# Patient Record
Sex: FEMALE | Race: WHITE | NOT HISPANIC OR LATINO | Employment: FULL TIME | URBAN - METROPOLITAN AREA
[De-identification: names, ages, dates, MRNs, and addresses within clinical notes are randomized per-mention and may not be internally consistent; named-entity substitution may affect disease eponyms.]

---

## 2017-06-16 ENCOUNTER — TRANSCRIBE ORDERS (OUTPATIENT)
Dept: LAB | Facility: HOSPITAL | Age: 52
End: 2017-06-16

## 2017-06-16 ENCOUNTER — APPOINTMENT (OUTPATIENT)
Dept: LAB | Facility: HOSPITAL | Age: 52
End: 2017-06-16

## 2017-06-16 DIAGNOSIS — Z00.8 HEALTH EXAMINATION IN POPULATION SURVEY: Primary | ICD-10-CM

## 2017-06-16 DIAGNOSIS — Z00.8 HEALTH EXAMINATION IN POPULATION SURVEY: ICD-10-CM

## 2017-06-16 LAB
CHOLEST SERPL-MCNC: 210 MG/DL (ref 50–200)
EST. AVERAGE GLUCOSE BLD GHB EST-MCNC: 105 MG/DL
HBA1C MFR BLD: 5.3 % (ref 4.2–6.3)
HDLC SERPL-MCNC: 93 MG/DL (ref 40–60)
LDLC SERPL CALC-MCNC: 99 MG/DL (ref 0–100)
TRIGL SERPL-MCNC: 89 MG/DL

## 2017-06-16 PROCEDURE — 83036 HEMOGLOBIN GLYCOSYLATED A1C: CPT

## 2017-06-16 PROCEDURE — 36415 COLL VENOUS BLD VENIPUNCTURE: CPT

## 2017-06-16 PROCEDURE — 80061 LIPID PANEL: CPT

## 2018-01-05 ENCOUNTER — ALLSCRIPTS OFFICE VISIT (OUTPATIENT)
Dept: OTHER | Facility: OTHER | Age: 53
End: 2018-01-05

## 2018-01-06 NOTE — PROGRESS NOTES
Assessment   1  Fever (780 60) (R50 9)   2  URI, acute (465 9) (J06 9)   3  Acute viral syndrome (079 99) (B34 9)    Plan   URI, acute    · Drink at least 6 glasses of water or juice a day ; Status:Complete;   Done: [id number]   · Good hand washing is one of the best ways to control the spread of germs ;    Status:Complete;   Done: 87MCF2635   · The following home treatments may soothe a sore throat ; Status:Complete;   Done:    [id number]   · Follow Up if Not Better Evaluation and Treatment  Follow-up  Status: Complete  Done:    [id number]   · Call [phone] if: The cough is not gone in 10 days ; Status:Complete;   Done:    [id number]   · Call [phone] if: The symptoms are not better in 7 days ; Status:Complete;   Done:    92NLN8041   · Call [phone] if: The symptoms seem worse ; Status:Complete;   Done:    61CYX7120    Discussion/Summary      1  VIRAL SYNDROME: ADVISED ON SYMPTOMATIC TREATMENT AND WRIST RETURN TO THE OFFICE IF SYMPTOMS NOT BETTER IN 3-5 DAYS  USE BRAT DIET FOR HER DIARRHEA  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 FOR TODAY AND TOMORROW AND FOR AT LEAST 24 HOURS FEVER FREE PERIOD  Possible side effects of new medications were reviewed with the patient/guardian today  The treatment plan was reviewed with the patient/guardian  The patient/guardian understands and agrees with the treatment plan      Chief Complaint   pt states that since wednesday she has had a cough and a fever   cd      History of Present Illness   HPI: fever and cough for the last 2 days ago, Tmax 100 5 , cough is dry and feel it upper , sore throat , headache , no chills , no ear pain, pressure over the ye  sick contacts  tired and exhausted      Review of Systems        Constitutional: fever,-- feeling poorly,-- chills-- and-- feeling tired, but-- no recent weight gain-- and-- no recent weight loss        ENT: sore throat, but-- no earache,-- no nosebleeds,-- no hearing loss,-- no nasal discharge-- and-- no hoarseness  Respiratory: cough, but-- no shortness of breath-- and-- no wheezing  Gastrointestinal: no complaints of abdominal pain, no constipation, no nausea or diarrhea, no vomiting, no bloody stools  Genitourinary: no complaints of dysuria, no incontinence, no pelvic pain, no dysmenorrhea, no vaginal discharge or abnormal vaginal bleeding  Musculoskeletal: no complaints of arthralgia, no myalgia, no joint swelling or stiffness, no limb pain or swelling  Integumentary: no complaints of skin rash or lesion, no itching or dry skin, no skin wounds  Neurological: headache, but-- no numbness,-- no confusion-- and-- no dizziness  Active Problems   1  Allergic rhinitis (477 9) (J30 9)   2  Anxiety (300 00) (F41 9)   3  Chronic Hydrosalpinx (614 1)   4  DVT (deep venous thrombosis) (453 40) (I82 409)   5  Encounter for gynecological examination without abnormal finding (V72 31) (Z01 419)   6  Female pelvic pain (625 9) (R10 2)   7  Fever (780 60) (R50 9)   8  Migraine headache (346 90) (G43 909)   9  Postmenopausal Atrophic Vaginitis (627 3)   10  Screening for cervical cancer (V76 2) (Z12 4)   11  Tick bite (919 4,E906 4) (W57 XXXA)   12  URI, acute (465 9) (J06 9)    Past Medical History   1  Anxiety (300 00) (F41 9)   2  History of Closed Fracture Of The Left Distal Tibia (824 0)   3  DVT (deep venous thrombosis) (453 40) (I82 409)   4  History of Encounter for routine gynecological examination (V72 31) (Z01 419)   5  History of Encounter for screening colonoscopy (V76 51) (Z12 11)   6  History of Encounter for screening for human papillomavirus (hpv) (V73 81) (Z11 51)   7  History of  1 (V22 0) (Z34 00)   8  History of Headache, menstrual migraine (346 40) (G43 829)   9  History of bronchitis (V12 69) (Z87 09)   10  History of reactive airway disease (V12 69) (Z87 09)   11  History of screening mammography (V15 89) (Z92 89)   12   History of Menarche (V21 8)   13  History of Menopause (627 2) (Z78 0)   14  History of Postmenopause atrophic vaginitis (627 3) (N95 2)   15  History of Seasonal allergies (477 9) (J30 2)   16  History of  (spontaneous vaginal delivery) (650) (O80)  Active Problems And Past Medical History Reviewed: The active problems and past medical history were reviewed and updated today  Family History   Mother    1  Family history of Hypertension  Father    2  Family history of Diabetes   3  Family history of Hypertension   4  Family history of Lung cancer   5  Family history of Thyroid disease  Brother    6  Family history of Thyroid disease  Paternal Grandmother    9  Family history of Breast cancer   8  Family history of Colon cancer  Multiple Family Members    5  Family history of Arthritis  Family History Reviewed: The family history was reviewed and updated today  Social History    · Drinks coffee   · 2-3 cups a day   · Denied: History of Drug Use   · Exercise: Walking   · Never A Smoker   · No drug use   · Not currently sexually active  The social history was reviewed and updated today  The social history was reviewed and is unchanged  Surgical History   1  History of Cholecystectomy   2  History of Eye Surgery   3  History of Tonsillectomy With Adenoidectomy    Current Meds    1  Aspirin 81 MG TABS; Therapy: 33DJT4089 to Recorded   2  ZyrTEC Allergy TABS Recorded     The medication list was reviewed and updated today  Allergies   1  Penicillins    Vitals    Recorded: 50JNJ8776 12:26PM   Temperature 99 F, Oral   Heart Rate 72, L Radial   Pulse Quality Regular, L Radial   Systolic 821, LUE, Sitting   Diastolic 70, LUE, Sitting   Height 5 ft 8 in   Weight 169 lb    BMI Calculated 25 7   BSA Calculated 1 9     Physical Exam        Constitutional      General appearance: No acute distress, well appearing and well nourished  Eyes      Conjunctiva and lids: No swelling, erythema or discharge         Ears, Nose, Mouth, and Throat      External inspection of ears and nose: Normal        Otoscopic examination: Tympanic membranes translucent with normal light reflex  Canals patent without erythema  Nasal mucosa, septum, and turbinates: Normal without edema or erythema  Oropharynx: Normal with no erythema, edema, exudate or lesions  Pulmonary      Respiratory effort: No increased work of breathing or signs of respiratory distress  Auscultation of lungs: Clear to auscultation  Cardiovascular      Palpation of heart: Normal PMI, no thrills  Auscultation of heart: Normal rate and rhythm, normal S1 and S2, without murmurs  Examination of extremities for edema and/or varicosities: Normal        Carotid pulses: Normal        Lymphatic      Palpation of lymph nodes in neck: No lymphadenopathy  Musculoskeletal      Gait and station: Normal        Digits and nails: Normal without clubbing or cyanosis  Inspection/palpation of joints, bones, and muscles: Normal        Skin      Skin and subcutaneous tissue: Normal without rashes or lesions  Psychiatric      Orientation to person, place, and time: Normal        Mood and affect: Normal           Message   Return to work or school:    Joline Phoenix is under my professional care  She was seen in my office on 1/5/18    She is able to return to work on   1/8/18             Signatures    Electronically signed by : Eliazar Lyles MD; Jan 5 2018 12:56PM EST                       (Author)

## 2018-01-22 VITALS
TEMPERATURE: 99 F | HEART RATE: 72 BPM | SYSTOLIC BLOOD PRESSURE: 112 MMHG | DIASTOLIC BLOOD PRESSURE: 70 MMHG | WEIGHT: 169 LBS | HEIGHT: 68 IN | BODY MASS INDEX: 25.61 KG/M2

## 2018-01-23 NOTE — MISCELLANEOUS
Message  Return to work or school:   Darryl Ferrer is under my professional care  She was seen in my office on 1/5/18   She is able to return to work on   1/8/18            Signatures   Electronically signed by : Radha Soliz MD; Jan 5 2018 12:56PM EST                       (Author)

## 2018-04-11 ENCOUNTER — APPOINTMENT (OUTPATIENT)
Dept: LAB | Facility: HOSPITAL | Age: 53
End: 2018-04-11
Payer: COMMERCIAL

## 2018-04-11 ENCOUNTER — TRANSCRIBE ORDERS (OUTPATIENT)
Dept: LAB | Facility: HOSPITAL | Age: 53
End: 2018-04-11

## 2018-04-11 DIAGNOSIS — Z11.1 SCREENING EXAMINATION FOR PULMONARY TUBERCULOSIS: ICD-10-CM

## 2018-04-11 DIAGNOSIS — Z11.1 SCREENING EXAMINATION FOR PULMONARY TUBERCULOSIS: Primary | ICD-10-CM

## 2018-04-11 PROCEDURE — 36415 COLL VENOUS BLD VENIPUNCTURE: CPT

## 2018-04-11 PROCEDURE — 86480 TB TEST CELL IMMUN MEASURE: CPT

## 2018-04-13 LAB
ANNOTATION COMMENT IMP: NORMAL
GAMMA INTERFERON BACKGROUND BLD IA-ACNC: 0.04 IU/ML
M TB IFN-G BLD-IMP: NEGATIVE
M TB IFN-G CD4+ BCKGRND COR BLD-ACNC: 0 IU/ML
M TB IFN-G CD4+ T-CELLS BLD-ACNC: 0.04 IU/ML
MITOGEN IGNF BLD-ACNC: 9.33 IU/ML
QUANTIFERON-TB GOLD IN TUBE: NORMAL
SERVICE CMNT-IMP: NORMAL

## 2018-06-07 ENCOUNTER — OFFICE VISIT (OUTPATIENT)
Dept: FAMILY MEDICINE CLINIC | Facility: CLINIC | Age: 53
End: 2018-06-07
Payer: COMMERCIAL

## 2018-06-07 VITALS
TEMPERATURE: 97.7 F | BODY MASS INDEX: 25.01 KG/M2 | DIASTOLIC BLOOD PRESSURE: 70 MMHG | WEIGHT: 165 LBS | HEIGHT: 68 IN | SYSTOLIC BLOOD PRESSURE: 100 MMHG
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
## 2018-06-07 DIAGNOSIS — L08.9 NONVENOMOUS INSECT BITE OF FACE WITH INFECTION, INITIAL ENCOUNTER: Primary | ICD-10-CM

## 2018-06-07 DIAGNOSIS — S00.86XA NONVENOMOUS INSECT BITE OF FACE WITH INFECTION, INITIAL ENCOUNTER: Primary | ICD-10-CM

## 2018-06-07 DIAGNOSIS — H69.81 EUSTACHIAN TUBE DYSFUNCTION, RIGHT: ICD-10-CM

## 2018-06-07 PROCEDURE — 99214 OFFICE O/P EST MOD 30 MIN: CPT | Performed by: PHYSICIAN ASSISTANT

## 2018-06-07 RX ORDER — DIPHENHYDRAMINE HCL 25 MG
25 TABLET ORAL
COMMUNITY

## 2018-06-07 RX ORDER — MELATONIN 10 MG
10 CAPSULE ORAL
COMMUNITY

## 2018-06-07 RX ORDER — LORATADINE 10 MG/1
10 TABLET ORAL DAILY
COMMUNITY

## 2018-06-07 RX ORDER — DOXYCYCLINE HYCLATE 100 MG/1
100 CAPSULE ORAL EVERY 12 HOURS SCHEDULED
Qty: 20 CAPSULE | Refills: 0 | Status: SHIPPED | OUTPATIENT
Start: 2018-06-07 | End: 2018-06-17

## 2018-06-07 RX ORDER — CETIRIZINE HCL 1 MG/ML
SOLUTION, ORAL ORAL
COMMUNITY
End: 2021-07-22

## 2018-06-07 NOTE — PATIENT INSTRUCTIONS
1   Non venomous insect bite with localized cellulitis-will treat with doxycycline 100 mg twice daily for 7-10 days  Caution of photosensitivity with this medication  Patient is to call back if she develops systemic symptoms, fevers, chills, or redness streaking from the wound  Patient verbalizes understanding and agreement with plan  2   Eustachian tube dysfunction of the right ear-recommend Flonase nasal spray, 2 sprays in each nostril twice daily for the next week  Follow up if symptoms would be persistent

## 2018-06-07 NOTE — PROGRESS NOTES
Assessment/Plan:  Patient Instructions   1  Non venomous insect bite with localized cellulitis-will treat with doxycycline 100 mg twice daily for 7-10 days  Caution of photosensitivity with this medication  Patient is to call back if she develops systemic symptoms, fevers, chills, or redness streaking from the wound  Patient verbalizes understanding and agreement with plan  2   Eustachian tube dysfunction of the right ear-recommend Flonase nasal spray, 2 sprays in each nostril twice daily for the next week  Follow up if symptoms would be persistent  Diagnoses and all orders for this visit:    Nonvenomous insect bite of face with infection, initial encounter  -     doxycycline hyclate (VIBRAMYCIN) 100 mg capsule; Take 1 capsule (100 mg total) by mouth every 12 (twelve) hours for 10 days    Eustachian tube dysfunction, right    Other orders  -     cetirizine (ZYRTEC ALLERGY) 10 mg tablet; Take by mouth  -     aspirin 81 MG tablet; Take by mouth  -     conjugated estrogens (PREMARIN) vaginal cream; Insert into the vagina  -     loratadine (CLARITIN) 10 mg tablet; Take 10 mg by mouth daily  -     Melatonin 10 MG CAPS; Take 10 mg by mouth  -     diphenhydrAMINE (BENADRYL) 25 mg tablet; Take 25 mg by mouth daily at bedtime as needed for itching          Subjective:   c/o right ear feels clogged intermittently  Flew in a plane 5/31  Also c/o a possible bug bite on left heel area  She noticed it last night  s     Patient ID: Alexi Fulton is a 47 y o  female  HPI:  This is a 58-year-old female who presents to the office with concerns over right ear being blocked for the past few days since she returned from recent air travel on vacation  She has been having some intermittent episodes of muffling in the ear and feeling as though it is crackling or popping  She has not had much pain but had occasional episode in the beginning  She also had some recent postnasal drip, allergy symptoms, and laryngitis  She is not currently having any fevers, chills, or cough  She also noticed a bug bite on her left lateral heel  This has some localized redness around it and a centralized darker area  She was concerned with possible tick or spider bite  The following portions of the patient's history were reviewed and updated as appropriate: allergies, current medications, past family history, past medical history, past social history, past surgical history and problem list     Review of Systems   Constitutional: Negative for chills and fever  HENT: Negative for congestion  Respiratory: Negative for chest tightness and shortness of breath  Cardiovascular: Negative for chest pain and palpitations  Gastrointestinal: Negative for abdominal pain, diarrhea and vomiting  Skin: Negative for rash  Insect bite of the left lateral heel  Neurological: Negative for dizziness  Objective:      /70   Temp 97 7 °F (36 5 °C)   Ht 5' 7 75" (1 721 m)   Wt 74 8 kg (165 lb)   BMI 25 27 kg/m²          Physical Exam   Constitutional: She appears well-developed and well-nourished  No distress  HENT:   Head: Normocephalic and atraumatic  There is a small amount of clear fluid behind the left tympanic membrane at the base  Eyes: Conjunctivae are normal  Pupils are equal, round, and reactive to light  Cardiovascular: Normal rate, normal heart sounds and intact distal pulses  No murmur heard  Pulmonary/Chest: Effort normal  No respiratory distress  She has no wheezes  Skin:   At the lateral left heel there is a small skin opening which is approximately 0 3 cm annular with surrounding erythema about 1 5 cm annular  This is blanchable  There is no purulence noted  This is nontender  There is no erythema or streaking proximally from the wound

## 2018-06-18 ENCOUNTER — TRANSCRIBE ORDERS (OUTPATIENT)
Dept: LAB | Facility: CLINIC | Age: 53
End: 2018-06-18

## 2018-06-18 ENCOUNTER — OFFICE VISIT (OUTPATIENT)
Dept: FAMILY MEDICINE CLINIC | Facility: CLINIC | Age: 53
End: 2018-06-18
Payer: COMMERCIAL

## 2018-06-18 VITALS
SYSTOLIC BLOOD PRESSURE: 100 MMHG | WEIGHT: 166 LBS | HEART RATE: 76 BPM | DIASTOLIC BLOOD PRESSURE: 70 MMHG | TEMPERATURE: 98 F | HEIGHT: 68 IN | BODY MASS INDEX: 25.16 KG/M2

## 2018-06-18 DIAGNOSIS — R19.8 CHANGE IN BOWEL MOVEMENT: Primary | ICD-10-CM

## 2018-06-18 PROCEDURE — 3008F BODY MASS INDEX DOCD: CPT | Performed by: FAMILY MEDICINE

## 2018-06-18 PROCEDURE — 1036F TOBACCO NON-USER: CPT | Performed by: FAMILY MEDICINE

## 2018-06-18 PROCEDURE — 99213 OFFICE O/P EST LOW 20 MIN: CPT | Performed by: FAMILY MEDICINE

## 2018-06-18 NOTE — PROGRESS NOTES
Assessment/Plan:    Change in bowel movement  Patient did note some changes in her bowel movement recently  Recommend check stool for O&P, culture, follow up after that  Diagnoses and all orders for this visit:    Change in bowel movement  -     Stool Enteric Bacterial Panel by PCR; Future  -     Ova and parasite examination; Future          Subjective:   C/o abdominal cramping  Thought she saw something in her stools moving  She is concerned with eith c diff or a parasite  Traveled recently to [address] [place] [address]   Also was looking for a new kitten at the [employer]     Patient ID: Spike Wong is a 47 y o  female  Patient was recently on doxycycline for cellulitis after insect bite on the leg  Currently, she is concerned that she may have some issues with infectious cause for diarrhea, as well as some abdominal discomfort  She thought perhaps the color of her stool was darker, and there may have been parasite she noted  She would like to have this evaluated  The following portions of the patient's history were reviewed and updated as appropriate: allergies, current medications, past family history, past medical history, past social history, past surgical history and problem list     Review of Systems   Constitutional: Negative  HENT: Negative  Respiratory: Negative  Cardiovascular: Negative  Gastrointestinal: Positive for abdominal pain and diarrhea  Objective:      /70   Pulse 76   Temp 98 °F (36 7 °C)   Ht 5' 7 75" (1 721 m)   Wt 75 3 kg (166 lb)   BMI 25 43 kg/m²          Physical Exam   Constitutional: She appears well-developed and well-nourished  HENT:   Head: Normocephalic and atraumatic  Cardiovascular: Normal rate, regular rhythm and normal heart sounds  Pulses:       Carotid pulses are 2+ on the right side, and 2+ on the left side  Pulmonary/Chest: Effort normal and breath sounds normal  She has no wheezes  She has no rales   She exhibits no tenderness  Abdominal: Soft  Bowel sounds are normal  She exhibits no distension  There is no tenderness  There is no rebound and no guarding  Nursing note and vitals reviewed

## 2018-06-18 NOTE — ASSESSMENT & PLAN NOTE
Patient did note some changes in her bowel movement recently  Recommend check stool for O&P, culture, follow up after that

## 2018-06-18 NOTE — PATIENT INSTRUCTIONS
Problem List Items Addressed This Visit        Other    Change in bowel movement - Primary     Patient did note some changes in her bowel movement recently  Recommend check stool for O&P, culture, follow up after that           Relevant Orders    Stool Enteric Bacterial Panel by PCR    Ova and parasite examination

## 2018-06-19 ENCOUNTER — LAB (OUTPATIENT)
Dept: LAB | Facility: CLINIC | Age: 53
End: 2018-06-19
Payer: COMMERCIAL

## 2018-06-19 DIAGNOSIS — R19.8 CHANGE IN BOWEL MOVEMENT: ICD-10-CM

## 2018-06-19 PROCEDURE — 87177 OVA AND PARASITES SMEARS: CPT

## 2018-06-19 PROCEDURE — 87505 NFCT AGENT DETECTION GI: CPT

## 2018-06-19 PROCEDURE — 87209 SMEAR COMPLEX STAIN: CPT

## 2018-06-20 LAB
CAMPYLOBACTER DNA SPEC NAA+PROBE: NORMAL
SALMONELLA DNA SPEC QL NAA+PROBE: NORMAL
SHIGA TOXIN STX GENE SPEC NAA+PROBE: NORMAL
SHIGELLA DNA SPEC QL NAA+PROBE: NORMAL

## 2018-06-22 LAB — O+P STL CONC: NORMAL

## 2018-07-03 ENCOUNTER — APPOINTMENT (OUTPATIENT)
Dept: LAB | Facility: HOSPITAL | Age: 53
End: 2018-07-03
Payer: COMMERCIAL

## 2018-07-03 ENCOUNTER — TRANSCRIBE ORDERS (OUTPATIENT)
Dept: LAB | Facility: HOSPITAL | Age: 53
End: 2018-07-03

## 2018-07-03 DIAGNOSIS — Z00.8 HEALTH EXAMINATION IN POPULATION SURVEY: Primary | ICD-10-CM

## 2018-07-03 LAB
CHOLEST SERPL-MCNC: 199 MG/DL (ref 50–200)
EST. AVERAGE GLUCOSE BLD GHB EST-MCNC: 114 MG/DL
HBA1C MFR BLD: 5.6 % (ref 4.2–6.3)
HDLC SERPL-MCNC: 82 MG/DL (ref 40–60)
LDLC SERPL CALC-MCNC: 73 MG/DL (ref 0–100)
NONHDLC SERPL-MCNC: 117 MG/DL
TRIGL SERPL-MCNC: 222 MG/DL

## 2018-07-03 PROCEDURE — 36415 COLL VENOUS BLD VENIPUNCTURE: CPT

## 2018-07-03 PROCEDURE — 83036 HEMOGLOBIN GLYCOSYLATED A1C: CPT

## 2018-07-03 PROCEDURE — 80061 LIPID PANEL: CPT

## 2018-07-20 ENCOUNTER — TRANSCRIBE ORDERS (OUTPATIENT)
Dept: LAB | Facility: HOSPITAL | Age: 53
End: 2018-07-20

## 2018-07-20 ENCOUNTER — APPOINTMENT (OUTPATIENT)
Dept: LAB | Facility: HOSPITAL | Age: 53
End: 2018-07-20

## 2018-07-20 DIAGNOSIS — Z00.8 HEALTH EXAMINATION IN POPULATION SURVEY: Primary | ICD-10-CM

## 2018-07-20 DIAGNOSIS — Z11.1 SCREENING EXAMINATION FOR PULMONARY TUBERCULOSIS: Primary | ICD-10-CM

## 2018-07-20 DIAGNOSIS — Z00.8 HEALTH EXAMINATION IN POPULATION SURVEY: ICD-10-CM

## 2018-07-20 DIAGNOSIS — Z11.1 SCREENING EXAMINATION FOR PULMONARY TUBERCULOSIS: ICD-10-CM

## 2018-07-20 PROCEDURE — 36415 COLL VENOUS BLD VENIPUNCTURE: CPT

## 2018-07-20 PROCEDURE — 86480 TB TEST CELL IMMUN MEASURE: CPT

## 2018-07-22 LAB
ANNOTATION COMMENT IMP: NORMAL
GAMMA INTERFERON BACKGROUND BLD IA-ACNC: 0.04 IU/ML
M TB IFN-G BLD-IMP: NEGATIVE
M TB IFN-G CD4+ BCKGRND COR BLD-ACNC: <0.01 IU/ML
M TB IFN-G CD4+ T-CELLS BLD-ACNC: 0.03 IU/ML
MITOGEN IGNF BLD-ACNC: 6.64 IU/ML
QUANTIFERON-TB GOLD IN TUBE: NORMAL
SERVICE CMNT-IMP: NORMAL

## 2018-11-15 ENCOUNTER — CLINICAL SUPPORT (OUTPATIENT)
Dept: FAMILY MEDICINE CLINIC | Facility: CLINIC | Age: 53
End: 2018-11-15
Payer: COMMERCIAL

## 2018-11-15 DIAGNOSIS — Z23 NEED FOR DIPHTHERIA-TETANUS-PERTUSSIS (TDAP) VACCINE: Primary | ICD-10-CM

## 2018-11-15 PROCEDURE — 90471 IMMUNIZATION ADMIN: CPT

## 2018-11-15 PROCEDURE — 90715 TDAP VACCINE 7 YRS/> IM: CPT

## 2018-11-15 NOTE — PROGRESS NOTES
Pt presents for Adacel vaccination  She is becoming a grandmother for the 1st time and it has been recommended that she get vaccinated against pertussis  Administered Adacel 0 5cc IM to (L) delt  Tolerated without difficulty and pt left the office in no distress  --bb

## 2019-10-14 ENCOUNTER — VBI (OUTPATIENT)
Dept: ADMINISTRATIVE | Facility: OTHER | Age: 54
End: 2019-10-14

## 2019-10-14 NOTE — TELEPHONE ENCOUNTER
Initial attempt made and documented for CRC Screening Patient/Employee Outreach on 10/14/19  Follow-up outreach scheduled to be completed within 7 business days      Lazarus Gunning, MA

## 2020-12-18 ENCOUNTER — IMMUNIZATIONS (OUTPATIENT)
Dept: FAMILY MEDICINE CLINIC | Facility: HOSPITAL | Age: 55
End: 2020-12-18
Payer: COMMERCIAL

## 2020-12-18 DIAGNOSIS — Z23 ENCOUNTER FOR IMMUNIZATION: ICD-10-CM

## 2020-12-18 PROCEDURE — 91300 SARS-COV-2 / COVID-19 MRNA VACCINE (PFIZER-BIONTECH) 30 MCG: CPT

## 2020-12-18 PROCEDURE — 0001A SARS-COV-2 / COVID-19 MRNA VACCINE (PFIZER-BIONTECH) 30 MCG: CPT

## 2021-01-10 ENCOUNTER — IMMUNIZATIONS (OUTPATIENT)
Dept: FAMILY MEDICINE CLINIC | Facility: HOSPITAL | Age: 56
End: 2021-01-10

## 2021-01-10 DIAGNOSIS — Z23 ENCOUNTER FOR IMMUNIZATION: ICD-10-CM

## 2021-01-10 PROCEDURE — 91300 SARS-COV-2 / COVID-19 MRNA VACCINE (PFIZER-BIONTECH) 30 MCG: CPT

## 2021-01-10 PROCEDURE — 0002A SARS-COV-2 / COVID-19 MRNA VACCINE (PFIZER-BIONTECH) 30 MCG: CPT

## 2021-02-13 ENCOUNTER — APPOINTMENT (OUTPATIENT)
Dept: LAB | Facility: HOSPITAL | Age: 56
End: 2021-02-13

## 2021-02-13 DIAGNOSIS — U07.1 COVID-19: Primary | ICD-10-CM

## 2021-02-13 PROCEDURE — 87635 SARS-COV-2 COVID-19 AMP PRB: CPT

## 2021-02-14 LAB — SARS-COV-2 RNA RESP QL NAA+PROBE: NEGATIVE

## 2021-04-27 ENCOUNTER — TRANSCRIBE ORDERS (OUTPATIENT)
Dept: LAB | Facility: HOSPITAL | Age: 56
End: 2021-04-27

## 2021-04-27 ENCOUNTER — APPOINTMENT (OUTPATIENT)
Dept: LAB | Facility: HOSPITAL | Age: 56
End: 2021-04-27

## 2021-04-27 DIAGNOSIS — Z00.8 HEALTH EXAMINATION IN POPULATION SURVEY: Primary | ICD-10-CM

## 2021-04-27 DIAGNOSIS — Z00.8 HEALTH EXAMINATION IN POPULATION SURVEY: ICD-10-CM

## 2021-04-27 LAB
CHOLEST SERPL-MCNC: 202 MG/DL (ref 50–200)
EST. AVERAGE GLUCOSE BLD GHB EST-MCNC: 105 MG/DL
HBA1C MFR BLD: 5.3 %
HDLC SERPL-MCNC: 94 MG/DL
LDLC SERPL CALC-MCNC: 91 MG/DL (ref 0–100)
NONHDLC SERPL-MCNC: 108 MG/DL
TRIGL SERPL-MCNC: 83 MG/DL

## 2021-04-27 PROCEDURE — 36415 COLL VENOUS BLD VENIPUNCTURE: CPT

## 2021-04-27 PROCEDURE — 80061 LIPID PANEL: CPT

## 2021-04-27 PROCEDURE — 83036 HEMOGLOBIN GLYCOSYLATED A1C: CPT

## 2021-07-22 ENCOUNTER — OFFICE VISIT (OUTPATIENT)
Dept: FAMILY MEDICINE CLINIC | Facility: CLINIC | Age: 56
End: 2021-07-22
Payer: COMMERCIAL

## 2021-07-22 ENCOUNTER — APPOINTMENT (OUTPATIENT)
Dept: LAB | Facility: CLINIC | Age: 56
End: 2021-07-22
Payer: COMMERCIAL

## 2021-07-22 VITALS
HEIGHT: 68 IN | HEART RATE: 76 BPM | DIASTOLIC BLOOD PRESSURE: 68 MMHG | RESPIRATION RATE: 16 BRPM | TEMPERATURE: 98.1 F | WEIGHT: 168 LBS | BODY MASS INDEX: 25.46 KG/M2 | SYSTOLIC BLOOD PRESSURE: 96 MMHG

## 2021-07-22 DIAGNOSIS — Z23 NEED FOR SHINGLES VACCINE: ICD-10-CM

## 2021-07-22 DIAGNOSIS — E66.3 OVERWEIGHT (BMI 25.0-29.9): ICD-10-CM

## 2021-07-22 DIAGNOSIS — Z11.4 SCREENING FOR HIV (HUMAN IMMUNODEFICIENCY VIRUS): ICD-10-CM

## 2021-07-22 DIAGNOSIS — Z12.31 ENCOUNTER FOR SCREENING MAMMOGRAM FOR MALIGNANT NEOPLASM OF BREAST: ICD-10-CM

## 2021-07-22 DIAGNOSIS — E78.00 PURE HYPERCHOLESTEROLEMIA: ICD-10-CM

## 2021-07-22 DIAGNOSIS — Z00.00 HEALTH CARE MAINTENANCE: Primary | ICD-10-CM

## 2021-07-22 DIAGNOSIS — Z11.59 NEED FOR HEPATITIS C SCREENING TEST: ICD-10-CM

## 2021-07-22 DIAGNOSIS — Z12.4 SCREENING FOR CERVICAL CANCER: ICD-10-CM

## 2021-07-22 DIAGNOSIS — D49.2 SKIN NEOPLASM: ICD-10-CM

## 2021-07-22 DIAGNOSIS — Z12.11 SCREEN FOR COLON CANCER: ICD-10-CM

## 2021-07-22 PROBLEM — R19.8 CHANGE IN BOWEL MOVEMENT: Status: RESOLVED | Noted: 2018-06-18 | Resolved: 2021-07-22

## 2021-07-22 LAB — HCV AB SER QL: NORMAL

## 2021-07-22 PROCEDURE — 99396 PREV VISIT EST AGE 40-64: CPT | Performed by: FAMILY MEDICINE

## 2021-07-22 PROCEDURE — 90750 HZV VACC RECOMBINANT IM: CPT | Performed by: FAMILY MEDICINE

## 2021-07-22 PROCEDURE — 87389 HIV-1 AG W/HIV-1&-2 AB AG IA: CPT

## 2021-07-22 PROCEDURE — 90471 IMMUNIZATION ADMIN: CPT | Performed by: FAMILY MEDICINE

## 2021-07-22 PROCEDURE — 36415 COLL VENOUS BLD VENIPUNCTURE: CPT

## 2021-07-22 PROCEDURE — 86803 HEPATITIS C AB TEST: CPT

## 2021-07-22 NOTE — PATIENT INSTRUCTIONS
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

## 2021-07-22 NOTE — PROGRESS NOTES
Assessment and Plan:   1  Healthcare means, exam was completed  Blood work reviewed  Patient to get a shingles vaccine today and booster in 2 months  Hepatitis seen HIV screen discussed orders placed  2  Screening for colon cancer, refer for colonoscopy  3  Screening cervical cancer, refer to gyn  4  Screening breast cancer, mammography is ordered  5  BMI 25 92 diet exercise weight loss recommended   6  Hyperlipidemia, mildly elevated total however the rest of panel is normal   7  Patient to return in 1 year sooner if needed  Problem List Items Addressed This Visit        Other    Health care maintenance - Primary    Overweight (BMI 25 0-29  9)      BMI 25 92 diet exercise weight loss recommended         Pure hypercholesterolemia      Mild elevation of total, HDL, LDL, triglycerides all at goal           Other Visit Diagnoses     Screening for cervical cancer        Relevant Orders    Ambulatory referral to Gynecology    Screen for colon cancer        Relevant Orders    Ambulatory referral for colonoscopy    Encounter for screening mammogram for malignant neoplasm of breast        Relevant Orders    Mammo screening bilateral w 3d & cad    Need for hepatitis C screening test        Relevant Orders    Hepatitis C Antibody ([facility])    Screening for HIV (human immunodeficiency virus)        Relevant Orders    HIV 1/2 Antigen/Antibody (4th Generation) w Reflex [facility]    Skin neoplasm        Relevant Orders    Ambulatory referral to Dermatology                 Diagnoses and all orders for this visit:    Health care maintenance    Screening for cervical cancer  -     Ambulatory referral to Gynecology; Future    Screen for colon cancer  -     Ambulatory referral for colonoscopy; Future    Encounter for screening mammogram for malignant neoplasm of breast  -     Mammo screening bilateral w 3d & cad; Future    Need for hepatitis C screening test  -     Hepatitis C Antibody ([facility]);  Future    Screening for HIV (human immunodeficiency virus)  -     HIV 1/2 Antigen/Antibody (4th Generation) w Reflex [facility]; Future    Pure hypercholesterolemia    Overweight (BMI 25 0-29  9)    Skin neoplasm  -     Ambulatory referral to Dermatology; Future              Subjective:      Patient ID: Spike Wong is a 63 y o  female  CC:    Chief Complaint   Patient presents with    Follow-up     [facility]  mjs    Skin Lesion     Mole on left thigh  And one her face  HPI:     Patient is here for wellness exam for work  Patient is doing well  Patient did have blood work in April and this was discussed  Patient like me to check a lesion on her face and left thigh  The face lesion has been present since birth but seems to be enlarging  Patient is overdue for colonoscopy, mammography, and gyn exam      The following portions of the patient's history were reviewed and updated as appropriate: allergies, current medications, past family history, past medical history, past social history, past surgical history and problem list       Review of Systems   Constitutional: Negative  HENT: Negative  Eyes: Negative  Respiratory: Negative  Cardiovascular: Negative  Gastrointestinal:        HPI   Endocrine: Negative  Genitourinary:        HPI   Musculoskeletal: Negative  Skin:        HPI   Allergic/Immunologic: Negative  Neurological: Negative  Hematological: Negative  Psychiatric/Behavioral: Negative  Data to review:       Objective:    Vitals:    07/22/21 1100   BP: 96/68   Pulse: 76   Resp: 16   Temp: 98 1 °F (36 7 °C)   Weight: 76 2 kg (168 lb)   Height: 5' 7 5" (1 715 m)        Physical Exam  Vitals and nursing note reviewed  Constitutional:       Appearance: Normal appearance  HENT:      Head: Normocephalic and atraumatic  Nose: Nose normal       Mouth/Throat:      Mouth: Mucous membranes are moist       Pharynx: Oropharynx is clear   No oropharyngeal exudate or posterior oropharyngeal erythema  Eyes:      General: No scleral icterus  Right eye: No discharge  Left eye: No discharge  Extraocular Movements: Extraocular movements intact  Conjunctiva/sclera: Conjunctivae normal       Pupils: Pupils are equal, round, and reactive to light  Neck:      Vascular: No carotid bruit  Cardiovascular:      Rate and Rhythm: Normal rate and regular rhythm  Heart sounds: Normal heart sounds  Pulmonary:      Effort: Pulmonary effort is normal       Breath sounds: Normal breath sounds  Abdominal:      General: Bowel sounds are normal  There is no distension  Palpations: Abdomen is soft  There is no mass  Tenderness: There is no abdominal tenderness  There is no right CVA tenderness, left CVA tenderness, guarding or rebound  Hernia: No hernia is present  Musculoskeletal:         General: No swelling, tenderness, deformity or signs of injury  Cervical back: Normal range of motion and neck supple  No rigidity or tenderness  Right lower leg: No edema  Left lower leg: No edema  Lymphadenopathy:      Cervical: No cervical adenopathy  Skin:     General: Skin is dry  Findings: Lesion present  Comments: Area around the right nasal labial fold on face a small smooth tannish papule 0 3 x 0 3 cm  Left thigh with 0 6 x 0 3 cm slightly irregular slightly raised black brown neoplasm   Neurological:      General: No focal deficit present  Mental Status: She is alert and oriented to person, place, and time  Cranial Nerves: No cranial nerve deficit  Sensory: No sensory deficit  Motor: No weakness  Coordination: Coordination normal       Gait: Gait normal       Deep Tendon Reflexes: Reflexes normal    Psychiatric:         Mood and Affect: Mood normal          Behavior: Behavior normal          Thought Content:  Thought content normal          Judgment: Judgment normal              BMI Counseling: Body mass index is 25 92 kg/m²  The BMI is above normal  Nutrition recommendations include moderation in carbohydrate intake and reducing intake of cholesterol  Exercise recommendations include exercising 3-5 times per week

## 2021-07-23 LAB — HIV 1+2 AB+HIV1 P24 AG SERPL QL IA: NORMAL

## 2021-08-01 ENCOUNTER — OFFICE VISIT (OUTPATIENT)
Dept: URGENT CARE | Facility: CLINIC | Age: 56
End: 2021-08-01
Payer: COMMERCIAL

## 2021-08-01 VITALS
HEIGHT: 67 IN | OXYGEN SATURATION: 97 % | HEART RATE: 79 BPM | BODY MASS INDEX: 26.02 KG/M2 | SYSTOLIC BLOOD PRESSURE: 112 MMHG | DIASTOLIC BLOOD PRESSURE: 82 MMHG | RESPIRATION RATE: 16 BRPM | WEIGHT: 165.8 LBS | TEMPERATURE: 97.7 F

## 2021-08-01 DIAGNOSIS — J02.0 STREP PHARYNGITIS: Primary | ICD-10-CM

## 2021-08-01 LAB — S PYO AG THROAT QL: POSITIVE

## 2021-08-01 PROCEDURE — 87880 STREP A ASSAY W/OPTIC: CPT | Performed by: PHYSICIAN ASSISTANT

## 2021-08-01 PROCEDURE — G0382 LEV 3 HOSP TYPE B ED VISIT: HCPCS | Performed by: PHYSICIAN ASSISTANT

## 2021-08-01 RX ORDER — AZITHROMYCIN 500 MG/1
500 TABLET, FILM COATED ORAL DAILY
Qty: 5 TABLET | Refills: 0 | Status: SHIPPED | OUTPATIENT
Start: 2021-08-01 | End: 2021-08-06

## 2021-08-01 NOTE — PROGRESS NOTES
[facility]        NAME: Dinesh Latham is a 63 y o  female  : 1965    MRN: [id number]  DATE: 2021  TIME: 2:54 PM    Assessment and Plan   Strep pharyngitis [J02 0]  1  Strep pharyngitis  POCT rapid strepA    azithromycin (ZITHROMAX) 500 MG tablet         Patient Instructions     Take antibiotic as directed with food  Recommend probiotics for side effects  Continue with over-the-counter symptom relief  Monitor for worsening symptoms    Follow up with PCP in 3-5 days  Proceed to  ER if symptoms worsen  Chief Complaint     Chief Complaint   Patient presents with    Sore Throat     3-4 days, reports burning sensation in throat, denies fevers; son home with tonsillitis         History of Present Illness       Patient presents with complaint of sore throat x 3 days  She reports a burning sensation in her throat and an associated cough  She also notes having had some rhinorrhea and PND  Pt states that her son is currently being treated for tonsillitis  She denies fever, chills, sweats, ear pain, headache, and abdominal symptoms  She denies any known COVID exposure  Review of Systems   Review of Systems   Constitutional: Negative for chills and fever  HENT: Positive for postnasal drip, rhinorrhea and sore throat  Negative for ear pain  Eyes: Negative for pain and visual disturbance  Respiratory: Positive for cough  Negative for shortness of breath  Cardiovascular: Negative for chest pain and palpitations  Gastrointestinal: Negative for abdominal pain and vomiting  Genitourinary: Negative for dysuria and hematuria  Musculoskeletal: Negative for arthralgias and back pain  Skin: Negative for color change and rash  Neurological: Negative for seizures and syncope  All other systems reviewed and are negative          Current Medications       Current Outpatient Medications:     diphenhydrAMINE (BENADRYL) 25 mg tablet, Take 25 mg by mouth daily at bedtime as needed for itching, Disp: , Rfl:     loratadine (CLARITIN) 10 mg tablet, Take 10 mg by mouth daily, Disp: , Rfl:     Melatonin 10 MG CAPS, Take 10 mg by mouth 3 Daily prn, Disp: , Rfl:     aspirin 81 MG tablet, Take by mouth daily as needed  (Patient not taking: Reported on 8/1/2021), Disp: , Rfl:     azithromycin (ZITHROMAX) 500 MG tablet, Take 1 tablet (500 mg total) by mouth daily for 5 days, Disp: 5 tablet, Rfl: 0    Current Allergies     Allergies as of 08/01/2021 - Reviewed 08/01/2021   Allergen Reaction Noted    Penicillins Blisters 10/07/2013            The following portions of the patient's history were reviewed and updated as appropriate: allergies, current medications, past family history, past medical history, past social history, past surgical history and problem list      Past Medical History:   Diagnosis Date    Anxiety     07oct2013  last assessed    [facility]     [id number]  last assessed    Fracture     closed fracture of the left distal tibia    Menstrual migraine     Reactive airway disease     05jan2018 resolved    Seasonal allergies        Past Surgical History:   Procedure Laterality Date    CHOLECYSTECTOMY      EYE SURGERY      TONSILLECTOMY AND ADENOIDECTOMY         Family History   Problem Relation Age of Onset    Hypertension Mother     Diabetes Father     Hypertension Father     Lung cancer Father     Thyroid disease Father     Thyroid disease Brother     Breast cancer Paternal Grandmother     Colon cancer Paternal Grandmother     Arthritis Family          Medications have been verified  Objective   /82   Pulse 79   Temp 97 7 °F (36 5 °C) (Tympanic)   Resp 16   Ht 5' 7" (1 702 m)   Wt 75 2 kg (165 lb 12 8 oz)   SpO2 97%   BMI 25 97 kg/m²   No LMP recorded  Physical Exam     Physical Exam  Vitals and nursing note reviewed  Constitutional:       General: She is not in acute distress  Appearance: She is well-developed   She is not ill-appearing or diaphoretic  HENT:      Head: Normocephalic and atraumatic  Right Ear: Tympanic membrane and ear canal normal  No drainage or swelling  Left Ear: Tympanic membrane and ear canal normal  No drainage or swelling  Tympanic membrane is not erythematous  Mouth/Throat:      Mouth: Mucous membranes are moist       Pharynx: Oropharynx is clear  Uvula midline  Posterior oropharyngeal erythema present  No oropharyngeal exudate  Tonsils: No tonsillar exudate  0 on the right  0 on the left  Eyes:      Pupils: Pupils are equal, round, and reactive to light  Cardiovascular:      Rate and Rhythm: Normal rate and regular rhythm  Heart sounds: Normal heart sounds  Pulmonary:      Effort: Pulmonary effort is normal  No respiratory distress  Breath sounds: Normal breath sounds  No wheezing  Musculoskeletal:      Cervical back: Normal range of motion and neck supple  Lymphadenopathy:      Cervical: Cervical adenopathy present  Skin:     General: Skin is warm and dry  Neurological:      Mental Status: She is alert and oriented to person, place, and time     Psychiatric:         Behavior: Behavior normal

## 2021-08-14 ENCOUNTER — OFFICE VISIT (OUTPATIENT)
Dept: FAMILY MEDICINE CLINIC | Facility: CLINIC | Age: 56
End: 2021-08-14
Payer: COMMERCIAL

## 2021-08-14 VITALS
WEIGHT: 163 LBS | OXYGEN SATURATION: 97 % | SYSTOLIC BLOOD PRESSURE: 108 MMHG | BODY MASS INDEX: 25.58 KG/M2 | DIASTOLIC BLOOD PRESSURE: 66 MMHG | TEMPERATURE: 97.2 F | RESPIRATION RATE: 16 BRPM | HEART RATE: 68 BPM | HEIGHT: 67 IN

## 2021-08-14 DIAGNOSIS — J30.2 SEASONAL ALLERGIC RHINITIS, UNSPECIFIED TRIGGER: Primary | ICD-10-CM

## 2021-08-14 PROCEDURE — 99213 OFFICE O/P EST LOW 20 MIN: CPT | Performed by: FAMILY MEDICINE

## 2021-08-14 RX ORDER — FLUTICASONE PROPIONATE 50 MCG
2 SPRAY, SUSPENSION (ML) NASAL DAILY
Qty: 17 ML | Refills: 2 | Status: SHIPPED | OUTPATIENT
Start: 2021-08-14 | End: 2021-12-17

## 2021-08-14 RX ORDER — BENZONATATE 200 MG/1
200 CAPSULE ORAL 3 TIMES DAILY PRN
Qty: 40 CAPSULE | Refills: 0 | Status: SHIPPED | OUTPATIENT
Start: 2021-08-14

## 2021-08-14 RX ORDER — MONTELUKAST SODIUM 10 MG/1
10 TABLET ORAL
Qty: 30 TABLET | Refills: 5 | Status: SHIPPED | OUTPATIENT
Start: 2021-08-14

## 2021-08-14 NOTE — PROGRESS NOTES
Assessment and Plan:    Problem List Items Addressed This Visit     Allergic rhinitis - Primary     Patient has significant issues with allergies currently, including postnasal drip, likely leading to the cough  Will trial Tessalon, Flonase, Singulair  If these do not help, consider chest x-ray, further evaluation  Does not appear to be related to reactive airways  Relevant Medications    montelukast (SINGULAIR) 10 mg tablet    fluticasone (FLONASE) 50 mcg/act nasal spray    benzonatate (TESSALON) 200 MG capsule                 Diagnoses and all orders for this visit:    Seasonal allergic rhinitis, unspecified trigger  -     montelukast (SINGULAIR) 10 mg tablet; Take 1 tablet (10 mg total) by mouth daily at bedtime  -     fluticasone (FLONASE) 50 mcg/act nasal spray; 2 sprays into each nostril daily  -     benzonatate (TESSALON) 200 MG capsule; Take 1 capsule (200 mg total) by mouth 3 (three) times a day as needed for cough              Subjective:      Patient ID: Wilmer Lee is a 63 y o  female  CC:    Chief Complaint   Patient presents with    Cough     Pt here with cough, post nasal drip x 2 weeks, pt was tested for step and was postitve and was treated  Pt states she feels the cough is stronger  R neto       HPI:    Patient is here to follow-up on cough  She was recently seen by urgent care, checked for strep, it was positive  She was on antibiotics, but since then the cough has persisted  Some soreness on the left side  Continues with significant postnasal drip, but this is not unusual for her  Denies any face or tooth pain, no wheezing, no shortness of breath  Using Flonase (sometimes)  The following portions of the patient's history were reviewed and updated as appropriate: allergies, current medications, past family history, past medical history, past social history, past surgical history and problem list       Review of Systems   Constitutional: Negative      HENT: Positive for postnasal drip  Respiratory: Positive for cough  Cardiovascular: Negative  Gastrointestinal: Negative  Data to review:       Objective:    Vitals:    08/14/21 1011   BP: 108/66   BP Location: Left arm   Patient Position: Sitting   Cuff Size: Large   Pulse: 68   Resp: 16   Temp: (!) 97 2 °F (36 2 °C)   TempSrc: Temporal   SpO2: 97%   Weight: 73 9 kg (163 lb)   Height: 5' 7" (1 702 m)        Physical Exam  Vitals and nursing note reviewed  Constitutional:       Appearance: Normal appearance  HENT:      Head: Normocephalic  Right Ear: Hearing, tympanic membrane, ear canal and external ear normal       Left Ear: Hearing, tympanic membrane, ear canal and external ear normal       Nose: Nose normal       Mouth/Throat:      Lips: Pink  Mouth: Mucous membranes are moist       Dentition: Normal dentition  Tongue: No lesions  Tongue does not deviate from midline  Palate: No mass and lesions  Pharynx: Oropharynx is clear  Uvula midline  No pharyngeal swelling, oropharyngeal exudate, posterior oropharyngeal erythema or uvula swelling  Tonsils: No tonsillar exudate or tonsillar abscesses  Cardiovascular:      Rate and Rhythm: Normal rate and regular rhythm  Pulses: Normal pulses  Carotid pulses are 2+ on the right side and 2+ on the left side  Heart sounds: Normal heart sounds  No murmur heard  No friction rub  No gallop  Pulmonary:      Effort: Pulmonary effort is normal  No respiratory distress  Breath sounds: Normal breath sounds  No stridor  No wheezing, rhonchi or rales  Chest:      Chest wall: No tenderness  Neurological:      Mental Status: She is alert

## 2021-08-14 NOTE — ASSESSMENT & PLAN NOTE
Patient has significant issues with allergies currently, including postnasal drip, likely leading to the cough  Will trial Tessalon, Flonase, Singulair  If these do not help, consider chest x-ray, further evaluation  Does not appear to be related to reactive airways

## 2021-08-14 NOTE — PATIENT INSTRUCTIONS
Problem List Items Addressed This Visit     Allergic rhinitis - Primary     Patient has significant issues with allergies currently, including postnasal drip, likely leading to the cough  Will trial Tessalon, Flonase, Singulair  If these do not help, consider chest x-ray, further evaluation  Does not appear to be related to reactive airways  Relevant Medications    montelukast (SINGULAIR) 10 mg tablet    fluticasone (FLONASE) 50 mcg/act nasal spray    benzonatate (TESSALON) 200 MG capsule          COVID 19 Instructions    Spike Wong was advised to limit contact with others to essential tasks such as getting food, medications, and medical care  Proper handwashing reviewed, and Hand sanitzer when washing is not available  If the patient develops symptoms of COVID 19, the patient should call the office as soon as possible  For 7369-1465 Flu season, it is strongly recommended that Flu Vaccinations be obtained  Virtual Visits may be conducted in several ways: Aubree: You should get a text message when the provider is ready to see you  Click on the link in the text message, and the call should start  You will need to type in your name, and allow camera and microphone access  This is HIPPA compliant, and secure  Please try to download Google Duo  Once you do download this on your phone, you will be prompted to add your phone number to the account  After that, he should receive a text from [employer], and use that code to verify your phone number  After that, you should be able to use Google Duo to receive and make video calls  Please download Microsoft Teams to your phone or computer  You would get an email with the meeting after scheduling with the office  You will Join the meeting, and wait there till the provider joins as well  Instructions for downloading this are available from the office  This is HIPPA compliant, and secure      We are committed to getting you vaccinated as soon as possible and will be closely following CDC and [facility]  guidelines as they are released and revised  Please refer to our COVID-19 vaccine webpage for the most up to date information on the vaccine and our distribution efforts  [employer] tn    OUR NEW LOCATION:  Starting around 28June2021, our new location and phone are:    [address], [address] [place] [address], [address]  Fax: [phone]    Lab services and OB/GYN will be at this location as well

## 2021-09-23 ENCOUNTER — CLINICAL SUPPORT (OUTPATIENT)
Dept: FAMILY MEDICINE CLINIC | Facility: CLINIC | Age: 56
End: 2021-09-23
Payer: COMMERCIAL

## 2021-09-23 DIAGNOSIS — Z23 ENCOUNTER FOR HERPES ZOSTER VACCINATION: Primary | ICD-10-CM

## 2021-09-23 PROCEDURE — 90471 IMMUNIZATION ADMIN: CPT | Performed by: FAMILY MEDICINE

## 2021-09-23 PROCEDURE — 90750 HZV VACC RECOMBINANT IM: CPT | Performed by: FAMILY MEDICINE

## 2021-10-06 ENCOUNTER — TELEPHONE (OUTPATIENT)
Dept: GASTROENTEROLOGY | Facility: CLINIC | Age: 56
End: 2021-10-06

## 2021-10-06 ENCOUNTER — PREP FOR PROCEDURE (OUTPATIENT)
Dept: GASTROENTEROLOGY | Facility: CLINIC | Age: 56
End: 2021-10-06

## 2021-10-06 DIAGNOSIS — Z12.11 COLON CANCER SCREENING: Primary | ICD-10-CM

## 2021-10-19 ENCOUNTER — HOSPITAL ENCOUNTER (OUTPATIENT)
Dept: MAMMOGRAPHY | Facility: MEDICAL CENTER | Age: 56
Discharge: HOME | End: 2021-10-19
Payer: COMMERCIAL

## 2021-10-19 VITALS — WEIGHT: 163 LBS | BODY MASS INDEX: 25.58 KG/M2 | HEIGHT: 67 IN

## 2021-10-19 DIAGNOSIS — Z12.31 ENCOUNTER FOR SCREENING MAMMOGRAM FOR MALIGNANT NEOPLASM OF BREAST: ICD-10-CM

## 2021-10-19 PROCEDURE — 77067 SCR MAMMO BI INCL CAD: CPT

## 2021-10-19 PROCEDURE — 77063 BREAST TOMOSYNTHESIS BI: CPT

## 2021-10-20 ENCOUNTER — OFFICE VISIT (OUTPATIENT)
Dept: OBGYN CLINIC | Facility: CLINIC | Age: 56
End: 2021-10-20
Payer: COMMERCIAL

## 2021-10-20 VITALS
BODY MASS INDEX: 26.06 KG/M2 | HEIGHT: 67 IN | WEIGHT: 166 LBS | SYSTOLIC BLOOD PRESSURE: 110 MMHG | HEART RATE: 77 BPM | DIASTOLIC BLOOD PRESSURE: 62 MMHG

## 2021-10-20 DIAGNOSIS — Z11.51 SCREENING FOR HPV (HUMAN PAPILLOMAVIRUS): ICD-10-CM

## 2021-10-20 DIAGNOSIS — Z12.4 ENCOUNTER FOR PAPANICOLAOU SMEAR FOR CERVICAL CANCER SCREENING: ICD-10-CM

## 2021-10-20 DIAGNOSIS — L30.9 VULVAR DERMATITIS: ICD-10-CM

## 2021-10-20 DIAGNOSIS — Z01.411 ENCOUNTER FOR GYNECOLOGICAL EXAMINATION WITH ABNORMAL FINDING: Primary | ICD-10-CM

## 2021-10-20 PROCEDURE — G0476 HPV COMBO ASSAY CA SCREEN: HCPCS | Performed by: NURSE PRACTITIONER

## 2021-10-20 PROCEDURE — G0145 SCR C/V CYTO,THINLAYER,RESCR: HCPCS | Performed by: NURSE PRACTITIONER

## 2021-10-20 PROCEDURE — 99386 PREV VISIT NEW AGE 40-64: CPT | Performed by: NURSE PRACTITIONER

## 2021-10-21 ENCOUNTER — CONSULT (OUTPATIENT)
Dept: DERMATOLOGY | Facility: CLINIC | Age: 56
End: 2021-10-21
Payer: COMMERCIAL

## 2021-10-21 ENCOUNTER — IMMUNIZATIONS (OUTPATIENT)
Dept: FAMILY MEDICINE CLINIC | Facility: HOSPITAL | Age: 56
End: 2021-10-21

## 2021-10-21 VITALS — TEMPERATURE: 97.5 F | HEIGHT: 67 IN | BODY MASS INDEX: 26.68 KG/M2 | WEIGHT: 170 LBS

## 2021-10-21 DIAGNOSIS — D18.01 CHERRY ANGIOMA: ICD-10-CM

## 2021-10-21 DIAGNOSIS — D49.2 SKIN NEOPLASM: ICD-10-CM

## 2021-10-21 DIAGNOSIS — D22.70 MULTIPLE BENIGN MELANOCYTIC NEVI OF UPPER EXTREMITY, LOWER EXTREMITY, AND TRUNK: Primary | ICD-10-CM

## 2021-10-21 DIAGNOSIS — D22.5 MULTIPLE BENIGN MELANOCYTIC NEVI OF UPPER EXTREMITY, LOWER EXTREMITY, AND TRUNK: Primary | ICD-10-CM

## 2021-10-21 DIAGNOSIS — L82.1 SEBORRHEIC KERATOSES: ICD-10-CM

## 2021-10-21 DIAGNOSIS — D22.60 MULTIPLE BENIGN MELANOCYTIC NEVI OF UPPER EXTREMITY, LOWER EXTREMITY, AND TRUNK: Primary | ICD-10-CM

## 2021-10-21 DIAGNOSIS — Z23 ENCOUNTER FOR IMMUNIZATION: Primary | ICD-10-CM

## 2021-10-21 PROCEDURE — 0001A COVID-19 PFIZER VACC 0.3 ML: CPT

## 2021-10-21 PROCEDURE — 99243 OFF/OP CNSLTJ NEW/EST LOW 30: CPT | Performed by: STUDENT IN AN ORGANIZED HEALTH CARE EDUCATION/TRAINING PROGRAM

## 2021-10-21 PROCEDURE — 91300 COVID-19 PFIZER VACC 0.3 ML: CPT

## 2021-10-22 LAB
HPV HR 12 DNA CVX QL NAA+PROBE: NEGATIVE
HPV16 DNA CVX QL NAA+PROBE: NEGATIVE
HPV18 DNA CVX QL NAA+PROBE: NEGATIVE

## 2021-10-27 LAB
LAB AP GYN PRIMARY INTERPRETATION: NORMAL
Lab: NORMAL

## 2021-12-08 ENCOUNTER — TELEPHONE (OUTPATIENT)
Dept: GASTROENTEROLOGY | Facility: MEDICAL CENTER | Age: 56
End: 2021-12-08

## 2021-12-16 ENCOUNTER — TELEPHONE (OUTPATIENT)
Dept: GASTROENTEROLOGY | Facility: MEDICAL CENTER | Age: 56
End: 2021-12-16

## 2021-12-17 ENCOUNTER — ANESTHESIA (OUTPATIENT)
Dept: GASTROENTEROLOGY | Facility: MEDICAL CENTER | Age: 56
End: 2021-12-17

## 2021-12-17 ENCOUNTER — ANESTHESIA EVENT (OUTPATIENT)
Dept: GASTROENTEROLOGY | Facility: MEDICAL CENTER | Age: 56
End: 2021-12-17

## 2021-12-17 ENCOUNTER — HOSPITAL ENCOUNTER (OUTPATIENT)
Dept: GASTROENTEROLOGY | Facility: MEDICAL CENTER | Age: 56
Setting detail: OUTPATIENT SURGERY
Discharge: HOME | End: 2021-12-17
Admitting: INTERNAL MEDICINE
Payer: COMMERCIAL

## 2021-12-17 VITALS
HEIGHT: 67 IN | BODY MASS INDEX: 26.68 KG/M2 | WEIGHT: 170 LBS | SYSTOLIC BLOOD PRESSURE: 118 MMHG | DIASTOLIC BLOOD PRESSURE: 57 MMHG | OXYGEN SATURATION: 100 % | RESPIRATION RATE: 20 BRPM | HEART RATE: 66 BPM | TEMPERATURE: 98.3 F

## 2021-12-17 DIAGNOSIS — Z12.11 COLON CANCER SCREENING: ICD-10-CM

## 2021-12-17 PROCEDURE — 45385 COLONOSCOPY W/LESION REMOVAL: CPT | Performed by: INTERNAL MEDICINE

## 2021-12-17 PROCEDURE — 88305 TISSUE EXAM BY PATHOLOGIST: CPT | Performed by: PATHOLOGY

## 2021-12-17 RX ORDER — PROPOFOL 10 MG/ML
INJECTION, EMULSION INTRAVENOUS AS NEEDED
Status: DISCONTINUED | OUTPATIENT
Start: 2021-12-17 | End: 2021-12-17

## 2021-12-17 RX ORDER — SODIUM CHLORIDE 9 MG/ML
125 INJECTION, SOLUTION INTRAVENOUS CONTINUOUS
Status: DISCONTINUED | OUTPATIENT
Start: 2021-12-17 | End: 2021-12-21 | Stop reason: HOSPADM

## 2021-12-17 RX ADMIN — Medication 40 MG: at 14:26

## 2021-12-17 RX ADMIN — PROPOFOL 130 MG: 10 INJECTION, EMULSION INTRAVENOUS at 14:20

## 2021-12-17 RX ADMIN — PROPOFOL 50 MG: 10 INJECTION, EMULSION INTRAVENOUS at 14:30

## 2021-12-17 RX ADMIN — PROPOFOL 50 MG: 10 INJECTION, EMULSION INTRAVENOUS at 14:22

## 2021-12-17 RX ADMIN — PROPOFOL 70 MG: 10 INJECTION, EMULSION INTRAVENOUS at 14:25

## 2021-12-17 RX ADMIN — SODIUM CHLORIDE 125 ML/HR: 0.9 INJECTION, SOLUTION INTRAVENOUS at 14:02

## 2022-05-29 ENCOUNTER — APPOINTMENT (OUTPATIENT)
Dept: LAB | Facility: HOSPITAL | Age: 57
End: 2022-05-29

## 2022-05-29 DIAGNOSIS — Z00.8 HEALTH EXAMINATION IN POPULATION SURVEYS: ICD-10-CM

## 2022-05-29 LAB
CHOLEST SERPL-MCNC: 216 MG/DL
EST. AVERAGE GLUCOSE BLD GHB EST-MCNC: 111 MG/DL
HBA1C MFR BLD: 5.5 %
HDLC SERPL-MCNC: 96 MG/DL
LDLC SERPL CALC-MCNC: 103 MG/DL (ref 0–100)
NONHDLC SERPL-MCNC: 120 MG/DL
TRIGL SERPL-MCNC: 86 MG/DL

## 2022-05-29 PROCEDURE — 83036 HEMOGLOBIN GLYCOSYLATED A1C: CPT

## 2022-05-29 PROCEDURE — 80061 LIPID PANEL: CPT

## 2022-05-29 PROCEDURE — 36415 COLL VENOUS BLD VENIPUNCTURE: CPT

## 2022-10-12 PROBLEM — Z00.00 HEALTH CARE MAINTENANCE: Status: RESOLVED | Noted: 2021-07-22 | Resolved: 2022-10-12

## 2022-10-12 PROBLEM — Z12.11 SCREENING FOR COLON CANCER: Status: RESOLVED | Noted: 2021-07-22 | Resolved: 2022-10-12

## 2023-04-10 PROBLEM — H20.9 UVEITIS: Status: ACTIVE | Noted: 2023-04-10

## 2023-04-26 ENCOUNTER — OFFICE VISIT (OUTPATIENT)
Dept: FAMILY MEDICINE CLINIC | Facility: CLINIC | Age: 58
End: 2023-04-26

## 2023-04-26 VITALS
BODY MASS INDEX: 26.37 KG/M2 | HEART RATE: 78 BPM | OXYGEN SATURATION: 98 % | DIASTOLIC BLOOD PRESSURE: 82 MMHG | HEIGHT: 67 IN | SYSTOLIC BLOOD PRESSURE: 110 MMHG | WEIGHT: 168 LBS

## 2023-04-26 DIAGNOSIS — Z12.31 BREAST CANCER SCREENING BY MAMMOGRAM: ICD-10-CM

## 2023-04-26 DIAGNOSIS — Z12.4 CERVICAL CANCER SCREENING: ICD-10-CM

## 2023-04-26 DIAGNOSIS — H20.9 UVEITIS: Primary | ICD-10-CM

## 2023-04-26 RX ORDER — KETOROLAC TROMETHAMINE 4 MG/ML
1 SOLUTION/ DROPS OPHTHALMIC 4 TIMES DAILY
Qty: 5 ML | Refills: 2 | Status: SHIPPED | OUTPATIENT
Start: 2023-04-26

## 2023-04-26 NOTE — PROGRESS NOTES
Name: Lilly Epstein      : 1965      MRN: [id number]  Encounter Provider: Jayme Knowles MD  Encounter Date: 2023   Encounter department: [facility] PRIMARY CARE    Assessment & Plan     1  Uveitis  Assessment & Plan:  Vision and irritation are much improved  At this point, she can discontinue the TobraDex drops  Consider use of ketorolac eyedrops, and follow-up with ophthalmology at some point  Orders:  -     ketorolac (ACULAR) 0 4 % SOLN; Administer 1 drop to both eyes 4 (four) times a day    2  Cervical cancer screening  -     Ambulatory Referral to Obstetrics / Gynecology; Future    3  Breast cancer screening by mammogram  -     Mammo screening bilateral w 3d & cad; Future; Expected date: 2023           Subjective      Chief Complaint   Patient presents with   • Follow-up     Follow up for uveitis       Patient seems to be doing much better  Much less irritation after using the TobraDex drops  She is continuing to use the TobraDex  Review of Systems   Constitutional: Negative  HENT: Negative  Eyes: Negative  Respiratory: Negative  Cardiovascular: Negative  Gastrointestinal: Negative  Endocrine: Negative  Genitourinary: Negative  Musculoskeletal: Negative  Skin: Negative  Allergic/Immunologic: Negative  Neurological: Negative  Hematological: Negative  Psychiatric/Behavioral: Negative          Current Outpatient Medications on File Prior to Visit   Medication Sig   • diphenhydrAMINE (BENADRYL) 25 mg tablet Take 25 mg by mouth daily at bedtime as needed for itching   • loratadine (CLARITIN) 10 mg tablet Take 10 mg by mouth daily   • montelukast (SINGULAIR) 10 mg tablet Take 1 tablet (10 mg total) by mouth daily at bedtime   • tobramycin-dexamethasone (TOBRADEX) ophthalmic suspension Administer 1 drop to both eyes every 4 (four) hours while awake   • triamcinolone (KENALOG) 0 1 % ointment Apply topically 2 (two) times a day   • "fluticasone (FLONASE) 50 mcg/act nasal spray 2 sprays into each nostril daily       Objective     /82 (BP Location: Right arm, Patient Position: Sitting, Cuff Size: Standard)   Pulse 78   Ht 5' 7\" (1 702 m)   Wt 76 2 kg (168 lb)   SpO2 98%   BMI 26 31 kg/m²     Physical Exam  Vitals and nursing note reviewed  Constitutional:       Appearance: Normal appearance  Eyes:      General: Lids are normal    Neurological:      Mental Status: She is alert         Alvarez Roger MD  "

## 2023-04-26 NOTE — PATIENT INSTRUCTIONS
1  Uveitis  Assessment & Plan:  Vision and irritation are much improved  At this point, she can discontinue the TobraDex drops  Consider use of ketorolac eyedrops, and follow-up with ophthalmology at some point  Orders:  -     ketorolac (ACULAR) 0 4 % SOLN; Administer 1 drop to both eyes 4 (four) times a day    2  Cervical cancer screening  -     Ambulatory Referral to Obstetrics / Gynecology; Future    3  Breast cancer screening by mammogram  -     Mammo screening bilateral w 3d & cad; Future; Expected date: 2023        COVID 19 Instructions    Julianejesus Tuttle was advised to limit contact with others to essential tasks such as getting food, medications, and medical care  Proper handwashing reviewed, and Hand sanitzer when washing is not available  If the patient develops symptoms of COVID 19, the patient should call the office as soon as possible  For 3365-1661 Flu season, it is strongly recommended that Flu Vaccinations be obtained  Virtual Visits:  Aubree: This works on smart phones (any phone with Internet browsing capability)  You should get a text message when the provider is ready to see you  Click on the link in the text message, and the call should start  You will need to type in your name, and allow camera and microphone access  This is HIPPA compliant, and secure  If you have not already done so, get immunized to COVID 19  We are committed to getting you vaccinated as soon as possible and will be closely following CDC and [facility]  guidelines as they are released and revised  Please refer to our COVID-19 vaccine webpage for the most up to date information on the vaccine and our distribution efforts  This site will also have the most up to date recommendations for COVID booster vaccine      Da bateman    Call [phone] ([phone]), option 7    OUR NEW LOCATION:    [address] [facility] [address], [address] [place] [address] [address] [place], [place], [address] [facility]  Fax: [phone]    Lab services and OB/GYN are at this location as well

## 2023-04-26 NOTE — ASSESSMENT & PLAN NOTE
Vision and irritation are much improved  At this point, she can discontinue the TobraDex drops  Consider use of ketorolac eyedrops, and follow-up with ophthalmology at some point

## 2023-05-06 ENCOUNTER — OFFICE VISIT (OUTPATIENT)
Dept: URGENT CARE | Facility: MEDICAL CENTER | Age: 58
End: 2023-05-06

## 2023-05-06 VITALS
HEART RATE: 69 BPM | OXYGEN SATURATION: 96 % | DIASTOLIC BLOOD PRESSURE: 85 MMHG | SYSTOLIC BLOOD PRESSURE: 144 MMHG | HEIGHT: 67 IN | TEMPERATURE: 97 F | RESPIRATION RATE: 18 BRPM | WEIGHT: 167 LBS | BODY MASS INDEX: 26.21 KG/M2

## 2023-05-06 DIAGNOSIS — J30.2 SEASONAL ALLERGIC RHINITIS, UNSPECIFIED TRIGGER: Primary | ICD-10-CM

## 2023-05-06 DIAGNOSIS — R05.1 ACUTE COUGH: ICD-10-CM

## 2023-05-06 RX ORDER — METHYLPREDNISOLONE 4 MG/1
TABLET ORAL
Qty: 1 EACH | Refills: 0 | Status: SHIPPED | OUTPATIENT
Start: 2023-05-06

## 2023-05-06 RX ORDER — MONTELUKAST SODIUM 10 MG/1
10 TABLET ORAL
Qty: 30 TABLET | Refills: 0 | Status: SHIPPED | OUTPATIENT
Start: 2023-05-06

## 2023-05-06 NOTE — PATIENT INSTRUCTIONS
Continue Flonase and Claritin  Restart Singulair  Take prednisone as instructed  While on it, do not take any  Ibuprofen or Ibuprofen like products  May safely take Tylenol as needed  Follow up with your PCP if not improving over next  7-10 days

## 2023-05-06 NOTE — PROGRESS NOTES
About a week ago  Some postnasal [facility]    NAME: Pernell Terry is a 61 y o  female  : 1965    MRN: [id number]  DATE: May 6, 2023  TIME: 2:20 PM    Assessment and Plan   Seasonal allergic rhinitis, unspecified trigger [J30 2]  1  Seasonal allergic rhinitis, unspecified trigger  methylPREDNISolone 4 MG tablet therapy pack    montelukast (SINGULAIR) 10 mg tablet      2  Acute cough  methylPREDNISolone 4 MG tablet therapy pack    montelukast (SINGULAIR) 10 mg tablet          Patient Instructions   Patient Instructions   Continue Flonase and Claritin  Restart Singulair  Take prednisone as instructed  While on it, do not take any  Ibuprofen or Ibuprofen like products  May safely take Tylenol as needed  Follow up with your PCP if not improving over next  7-10 days  Chief Complaint     Chief Complaint   Patient presents with    Cough     Patient  has had a cough for a week and allergies, sore throat, negative covid test       History of Present Illness   Pernell Terry presents to the clinic c/o  48-year-old female comes in with Acute cough  Started: Cough started about a week ago  Associated signs and symptoms: Some itching of throat and postnasal drip  Mild rhinorrhea  Occasional sneezing  Voice seems deeper than normal   Modifying factors: Patient has been doing Claritin  She is also doing generic Flonase  She took a rapid COVID test and that was negative  Known Exposures: Denies any known exposures  Hx asthma: History of reactive airway disease, allergic rhinitis  Smoker: Denies  Review of Systems   Review of Systems   Constitutional: Negative  HENT: Positive for congestion, postnasal drip, rhinorrhea, sore throat and voice change  Negative for ear discharge, ear pain, sinus pressure, sinus pain, sneezing and trouble swallowing  Respiratory: Positive for cough  Negative for apnea, choking, chest tightness, shortness of breath, wheezing and stridor  Cardiovascular: Negative          Current Medications     Long-Term Medications   Medication Sig Dispense Refill    diphenhydrAMINE (BENADRYL) 25 mg tablet Take 25 mg by mouth daily at bedtime as needed for itching      ketorolac (ACULAR) 0 4 % SOLN Administer 1 drop to both eyes 4 (four) times a day 5 mL 2    loratadine (CLARITIN) 10 mg tablet Take 10 mg by mouth daily      montelukast (SINGULAIR) 10 mg tablet Take 1 tablet (10 mg total) by mouth daily at bedtime 30 tablet 0    tobramycin-dexamethasone (TOBRADEX) ophthalmic suspension Administer 1 drop to both eyes every 4 (four) hours while awake 5 mL 0    triamcinolone (KENALOG) 0 1 % ointment Apply topically 2 (two) times a day 30 g 1    fluticasone (FLONASE) 50 mcg/act nasal spray 2 sprays into each nostril daily 17 mL 2    montelukast (SINGULAIR) 10 mg tablet Take 1 tablet (10 mg total) by mouth daily at bedtime (Patient not taking: Reported on 5/6/2023) 30 tablet 5       Current Allergies     Allergies as of 05/06/2023 - Reviewed 05/06/2023   Allergen Reaction Noted    Penicillins Blisters 10/07/2013          The following portions of the patient's history were reviewed and updated as appropriate: allergies, current medications, past family history, past medical history, past social history, past surgical history and problem list   Past Medical History:   Diagnosis Date    Anxiety     07oct2013  last assessed    [facility])     [id number]  last assessed    Fracture     closed fracture of the left distal tibia    Menstrual migraine     Pneumonia     Reactive airway disease     05jan2018 resolved    Seasonal allergies      Past Surgical History:   Procedure Laterality Date    CHOLECYSTECTOMY      EYE SURGERY      TONSILLECTOMY AND ADENOIDECTOMY       Family History   Problem Relation Age of Onset    Hypertension Mother     Diabetes Father     Hypertension Father     Lung cancer Father 79    Thyroid disease Father "   Thyroid disease Brother     Breast cancer Paternal Grandmother [de-identified]    Colon cancer Paternal Grandmother [de-identified]    Arthritis Family     No Known Problems Maternal Grandmother     No Known Problems Maternal Grandfather     No Known Problems Paternal Grandfather     Ovarian cancer Neg Hx     Uterine cancer Neg Hx        Objective   /85   Pulse 69   Temp (!) 97 °F (36 1 °C)   Resp 18   Ht 5' 7\" (1 702 m)   Wt 75 8 kg (167 lb)   SpO2 96%   BMI 26 16 kg/m²   No LMP recorded  Patient is postmenopausal        Physical Exam     Physical Exam  Vitals and nursing note reviewed  Constitutional:       General: She is not in acute distress  Appearance: She is well-developed  She is not ill-appearing, toxic-appearing or diaphoretic  Comments: No trismus or conversational dyspnea  HENT:      Head: Normocephalic and atraumatic  Right Ear: Tympanic membrane, ear canal and external ear normal       Left Ear: Tympanic membrane, ear canal and external ear normal       Nose: Congestion present  No rhinorrhea  Mouth/Throat:      Mouth: Mucous membranes are moist       Pharynx: Posterior oropharyngeal erythema present  No oropharyngeal exudate  Comments: Cobblestoning posterior pharynx with patchy redness  Eyes:      General:         Right eye: No discharge  Left eye: No discharge  Conjunctiva/sclera: Conjunctivae normal       Pupils: Pupils are equal, round, and reactive to light  Cardiovascular:      Rate and Rhythm: Normal rate and regular rhythm  Heart sounds: Normal heart sounds  No murmur heard  No friction rub  No gallop  Pulmonary:      Effort: Pulmonary effort is normal  No respiratory distress  Breath sounds: Normal breath sounds  No stridor  No wheezing, rhonchi or rales  Musculoskeletal:      Cervical back: Normal range of motion and neck supple  No rigidity or tenderness  Lymphadenopathy:      Cervical: No cervical adenopathy     Skin:     " General: Skin is warm and dry  Coloration: Skin is not jaundiced or pale  Findings: No rash  Neurological:      Mental Status: She is alert and oriented to person, place, and time     Psychiatric:         Mood and Affect: Mood normal          Behavior: Behavior normal

## 2023-05-17 ENCOUNTER — HOSPITAL ENCOUNTER (OUTPATIENT)
Dept: MAMMOGRAPHY | Facility: MEDICAL CENTER | Age: 58
Discharge: HOME | End: 2023-05-17

## 2023-05-17 VITALS — BODY MASS INDEX: 26.23 KG/M2 | WEIGHT: 167.11 LBS | HEIGHT: 67 IN

## 2023-05-17 DIAGNOSIS — Z12.31 BREAST CANCER SCREENING BY MAMMOGRAM: ICD-10-CM

## 2023-05-22 DIAGNOSIS — Z01.84 IMMUNITY TO VARICELLA DETERMINED BY SEROLOGIC TEST: Primary | ICD-10-CM

## 2023-05-24 ENCOUNTER — APPOINTMENT (OUTPATIENT)
Dept: LAB | Facility: CLINIC | Age: 58
End: 2023-05-24

## 2023-05-24 LAB — VZV IGG SER QL IA: NORMAL

## 2023-06-15 ENCOUNTER — OFFICE VISIT (OUTPATIENT)
Dept: FAMILY MEDICINE CLINIC | Facility: CLINIC | Age: 58
End: 2023-06-15
Payer: COMMERCIAL

## 2023-06-15 VITALS
HEIGHT: 67 IN | SYSTOLIC BLOOD PRESSURE: 110 MMHG | HEART RATE: 68 BPM | BODY MASS INDEX: 26.46 KG/M2 | TEMPERATURE: 97.3 F | WEIGHT: 168.6 LBS | RESPIRATION RATE: 14 BRPM | DIASTOLIC BLOOD PRESSURE: 72 MMHG

## 2023-06-15 DIAGNOSIS — Z00.00 HEALTHCARE MAINTENANCE: Primary | ICD-10-CM

## 2023-06-15 DIAGNOSIS — J30.2 SEASONAL ALLERGIC RHINITIS, UNSPECIFIED TRIGGER: ICD-10-CM

## 2023-06-15 PROCEDURE — 99396 PREV VISIT EST AGE 40-64: CPT | Performed by: FAMILY MEDICINE

## 2023-06-15 RX ORDER — FLUTICASONE PROPIONATE 50 MCG
2 SPRAY, SUSPENSION (ML) NASAL DAILY
Qty: 17 ML | Refills: 5 | Status: SHIPPED | OUTPATIENT
Start: 2023-06-15 | End: 2023-09-13

## 2023-06-15 RX ORDER — SODIUM FLUORIDE1.1%, POTASSIUM NITRATE 5% 5.8; 57.5 MG/ML; MG/ML
GEL, DENTIFRICE DENTAL
COMMUNITY
Start: 2023-05-19

## 2023-06-15 RX ORDER — MONTELUKAST SODIUM 10 MG/1
10 TABLET ORAL
Qty: 30 TABLET | Refills: 5 | Status: SHIPPED | OUTPATIENT
Start: 2023-06-15

## 2023-06-15 NOTE — PATIENT INSTRUCTIONS
1  Healthcare maintenance  Comments:  Patient is doing quite well  Physical exam was reasonable  Today  2  Seasonal allergic rhinitis, unspecified trigger  Assessment & Plan:  Patient is requesting to restart Singulair which helped her quite a bit with allergies before  It is indicated for this, therefore I will send the prescription  Orders:  -     fluticasone (FLONASE) 50 mcg/act nasal spray; 2 sprays into each nostril daily  -     montelukast (SINGULAIR) 10 mg tablet; Take 1 tablet (10 mg total) by mouth daily at bedtime        COVID 19 Instructions    Seth Orozco was advised to limit contact with others to essential tasks such as getting food, medications, and medical care  Proper handwashing reviewed, and Hand sanitzer when washing is not available  If the patient develops symptoms of COVID 19, the patient should call the office as soon as possible  For 5036-9174 Flu season, it is strongly recommended that Flu Vaccinations be obtained  Virtual Visits:  AmWell: This works on smart phones (any phone with Internet browsing capability)  You should get a text message when the provider is ready to see you  Click on the link in the text message, and the call should start  You will need to type in your name, and allow camera and microphone access  This is HIPPA compliant, and secure  If you have not already done so, get immunized to COVID 19  We are committed to getting you vaccinated as soon as possible and will be closely following CDC and [facility]  guidelines as they are released and revised  Please refer to our COVID-19 vaccine webpage for the most up to date information on the vaccine and our distribution efforts  This site will also have the most up to date recommendations for COVID booster vaccine      Da bateman    Call [phone]), option 7    OUR NEW LOCATION:    [facility]  [address], [address] [facility]  [place], [address]  Fax: [phone]    Lab services and OB/GYN are at this location as well

## 2023-06-15 NOTE — ASSESSMENT & PLAN NOTE
Patient is requesting to restart Singulair which helped her quite a bit with allergies before  It is indicated for this, therefore I will send the prescription

## 2023-06-15 NOTE — PROGRESS NOTES
[address] [facility] PRIMARY CARE    NAME: Torey Boss  AGE: 61 y o  SEX: female  : 1965     DATE: 6/15/2023     Assessment and Plan:     Problem List Items Addressed This Visit     Allergic rhinitis     Patient is requesting to restart Singulair which helped her quite a bit with allergies before  It is indicated for this, therefore I will send the prescription  Relevant Medications    fluticasone (FLONASE) 50 mcg/act nasal spray    montelukast (SINGULAIR) 10 mg tablet   Other Visit Diagnoses     Healthcare maintenance    -  Primary    Patient is doing quite well  Physical exam was reasonable  Today  Immunizations and preventive care screenings were discussed with patient today  Appropriate education was printed on patient's after visit summary  Counseling:  Alcohol/drug use: discussed moderation in alcohol intake, the recommendations for healthy alcohol use, and avoidance of illicit drug use  Dental Health: discussed importance of regular tooth brushing, flossing, and dental visits  Injury prevention: discussed safety/seat belts, safety helmets, smoke detectors, carbon dioxide detectors, and smoking near bedding or upholstery  Sexual health: discussed sexually transmitted diseases, partner selection, use of condoms, avoidance of unintended pregnancy, and contraceptive alternatives  Exercise: the importance of regular exercise/physical activity was discussed  Recommend exercise 3-5 times per week for at least 30 minutes  No follow-ups on file  Chief Complaint:     Chief Complaint   Patient presents with   • Physical Exam     Patient in office for work physical      History of Present Illness:     Adult Annual Physical   Patient here for a comprehensive physical exam  The patient reports no problems      Diet and Physical Activity  Diet/Nutrition: well balanced diet, limited junk food, low fat diet, consuming 3-5 servings of fruits/vegetables daily, adequate fiber intake and adequate whole grain intake  Exercise: strength training exercises, 1-2 times a week on average and less than 30 minutes on average  Depression Screening  PHQ-2/9 Depression Screening    Little interest or pleasure in doing things: 0 - not at all  Feeling down, depressed, or hopeless: 0 - not at all  PHQ-2 Score: 0  PHQ-2 Interpretation: Negative depression screen       General Health  Sleep: sleeps well and Changes with shift work  Hearing: normal - bilateral   Vision: goes for regular eye exams, wears glasses and wears contacts  Dental: regular dental visits  /GYN Health  Patient is: postmenopausal  Last menstrual period:   Contraceptive method: Menopause  Review of Systems:     Review of Systems   Constitutional: Negative for chills and fever  HENT: Negative for ear pain and sore throat  Eyes: Negative for pain and visual disturbance  Respiratory: Negative for cough and shortness of breath  Cardiovascular: Negative for chest pain and palpitations  Gastrointestinal: Negative for abdominal pain and vomiting  Genitourinary: Negative for dysuria and hematuria  Musculoskeletal: Negative for arthralgias and back pain  Skin: Negative for color change and rash  Neurological: Negative for seizures and syncope  All other systems reviewed and are negative       Past Medical History:     Past Medical History:   Diagnosis Date   • Anxiety     07oct2013  last assessed   • DVT (deep venous thrombosis) ([facility] )     [id number]  last assessed   • Fracture     closed fracture of the left distal tibia   • Menstrual migraine    • Pneumonia    • Reactive airway disease     05jan2018 resolved   • Seasonal allergies       Past Surgical History:     Past Surgical History:   Procedure Laterality Date   • CHOLECYSTECTOMY     • EYE SURGERY     • TONSILLECTOMY AND ADENOIDECTOMY        Social History:     Social History     Socioeconomic History   • Marital status: Single     Spouse name: None   • Number of children: None   • Years of education: None   • Highest education level: None   Occupational History   • None   Tobacco Use   • Smoking status: Never   • Smokeless tobacco: Never   Vaping Use   • Vaping Use: Never used   Substance and Sexual Activity   • Alcohol use: Yes     Comment: Rarely   • Drug use: No   • Sexual activity: Not Currently     Birth control/protection: Post-menopausal   Other Topics Concern   • None   Social History Narrative    Drinks coffee  2-3 cups a day    Exercise: walking     Social Determinants of Health     Financial Resource Strain: Not on file   Food Insecurity: Not on file   Transportation Needs: Not on file   Physical Activity: Not on file   Stress: Not on file   Social Connections: Not on file   Intimate Partner Violence: Not on file   Housing Stability: Not on file      Family History:     Family History   Problem Relation Age of Onset   • Hypertension Mother    • Diabetes Father    • Hypertension Father    • Lung cancer Father 79   • Thyroid disease Father    • Thyroid disease Brother    • Breast cancer Paternal Grandmother [de-identified]   • Colon cancer Paternal Grandmother [de-identified]   • Arthritis Family    • No Known Problems Maternal Grandmother    • No Known Problems Maternal Grandfather    • No Known Problems Paternal Grandfather    • Ovarian cancer Neg Hx    • Uterine cancer Neg Hx       Current Medications:     Current Outpatient Medications   Medication Sig Dispense Refill   • diphenhydrAMINE (BENADRYL) 25 mg tablet Take 25 mg by mouth daily at bedtime as needed for itching     • fluticasone (FLONASE) 50 mcg/act nasal spray 2 sprays into each nostril daily 17 mL 5   • ketorolac (ACULAR) 0 4 % SOLN Administer 1 drop to both eyes 4 (four) times a day 5 mL 2   • loratadine (CLARITIN) 10 mg tablet Take 10 mg by mouth daily     • montelukast (SINGULAIR) 10 mg tablet Take 1 tablet (10 mg total) by mouth daily at bedtime 30 "tablet 5   • Sodium Fluoride 5000 Sensitive 1 1-5 % GEL BRUSH DAILY AS DIRECTED  • triamcinolone (KENALOG) 0 1 % ointment Apply topically 2 (two) times a day 30 g 1     No current facility-administered medications for this visit  Allergies: Allergies   Allergen Reactions   • Penicillins Blisters     Annotation - [id number]: blisters      Physical Exam:     /72 (BP Location: Left arm, Patient Position: Sitting, Cuff Size: Adult)   Pulse 68   Temp (!) 97 3 °F (36 3 °C) (Temporal)   Resp 14   Ht 5' 7\" (1 702 m)   Wt 76 5 kg (168 lb 9 6 oz)   BMI 26 41 kg/m²     Physical Exam  Vitals and nursing note reviewed  Constitutional:       General: She is not in acute distress  Appearance: She is well-developed  She is not ill-appearing  HENT:      Head: Normocephalic and atraumatic  Right Ear: Tympanic membrane, ear canal and external ear normal       Left Ear: Tympanic membrane, ear canal and external ear normal    Eyes:      General: No scleral icterus  Right eye: No discharge  Left eye: No discharge  Conjunctiva/sclera: Conjunctivae normal       Pupils: Pupils are equal, round, and reactive to light  Cardiovascular:      Rate and Rhythm: Normal rate and regular rhythm  Pulses: Normal pulses  Heart sounds: No murmur heard  No friction rub  No gallop  Pulmonary:      Effort: Pulmonary effort is normal       Breath sounds: Normal breath sounds  Abdominal:      General: Abdomen is flat  There is no distension  Palpations: There is no mass  Tenderness: There is no abdominal tenderness  There is no right CVA tenderness, left CVA tenderness, guarding or rebound  Hernia: No hernia is present  Musculoskeletal:         General: Normal range of motion  Cervical back: Normal range of motion and neck supple  Lymphadenopathy:      Cervical: No cervical adenopathy  Skin:     General: Skin is warm and dry        Coloration: Skin is not " jaundiced  Findings: No bruising  Neurological:      General: No focal deficit present  Mental Status: She is alert and oriented to person, place, and time  Mental status is at baseline  Cranial Nerves: No cranial nerve deficit  Sensory: No sensory deficit  Motor: No weakness  Coordination: Coordination normal       Gait: Gait normal       Deep Tendon Reflexes: Reflexes normal    Psychiatric:         Mood and Affect: Mood normal          Behavior: Behavior normal          Thought Content:  Thought content normal          Judgment: Judgment normal           Darby Escalante MD  [address]

## 2023-06-16 ENCOUNTER — APPOINTMENT (OUTPATIENT)
Dept: LAB | Facility: CLINIC | Age: 58
End: 2023-06-16

## 2023-06-16 DIAGNOSIS — Z00.8 HEALTH EXAMINATION IN POPULATION SURVEY: ICD-10-CM

## 2023-06-16 LAB
CHOLEST SERPL-MCNC: 233 MG/DL
HDLC SERPL-MCNC: 86 MG/DL
LDLC SERPL CALC-MCNC: 127 MG/DL (ref 0–100)
NONHDLC SERPL-MCNC: 147 MG/DL
TRIGL SERPL-MCNC: 102 MG/DL

## 2023-06-16 PROCEDURE — 36415 COLL VENOUS BLD VENIPUNCTURE: CPT

## 2023-06-16 PROCEDURE — 80061 LIPID PANEL: CPT

## 2023-06-16 PROCEDURE — 83036 HEMOGLOBIN GLYCOSYLATED A1C: CPT

## 2023-06-17 LAB
EST. AVERAGE GLUCOSE BLD GHB EST-MCNC: 108 MG/DL
HBA1C MFR BLD: 5.4 %

## 2023-06-26 ENCOUNTER — OFFICE VISIT (OUTPATIENT)
Dept: FAMILY MEDICINE CLINIC | Facility: CLINIC | Age: 58
End: 2023-06-26
Payer: COMMERCIAL

## 2023-06-26 VITALS
WEIGHT: 168 LBS | HEIGHT: 67 IN | TEMPERATURE: 98.7 F | HEART RATE: 80 BPM | DIASTOLIC BLOOD PRESSURE: 78 MMHG | SYSTOLIC BLOOD PRESSURE: 110 MMHG | BODY MASS INDEX: 26.37 KG/M2

## 2023-06-26 DIAGNOSIS — J45.21 MILD INTERMITTENT REACTIVE AIRWAY DISEASE WITH ACUTE EXACERBATION: Primary | ICD-10-CM

## 2023-06-26 DIAGNOSIS — H65.01 NON-RECURRENT ACUTE SEROUS OTITIS MEDIA OF RIGHT EAR: ICD-10-CM

## 2023-06-26 PROCEDURE — 99213 OFFICE O/P EST LOW 20 MIN: CPT | Performed by: FAMILY MEDICINE

## 2023-06-26 RX ORDER — ALBUTEROL SULFATE 90 UG/1
2 AEROSOL, METERED RESPIRATORY (INHALATION) EVERY 4 HOURS PRN
Qty: 6.7 G | Refills: 0 | Status: SHIPPED | OUTPATIENT
Start: 2023-06-26

## 2023-06-26 RX ORDER — BENZONATATE 200 MG/1
200 CAPSULE ORAL 3 TIMES DAILY PRN
Qty: 20 CAPSULE | Refills: 0 | Status: SHIPPED | OUTPATIENT
Start: 2023-06-26

## 2023-06-26 RX ORDER — AZITHROMYCIN 250 MG/1
TABLET, FILM COATED ORAL
Qty: 6 TABLET | Refills: 0 | Status: SHIPPED | OUTPATIENT
Start: 2023-06-26 | End: 2023-07-01

## 2023-06-26 NOTE — PATIENT INSTRUCTIONS
1  Mild intermittent reactive airway disease with acute exacerbation  Assessment & Plan:  Increased cough, but not wheezing noted  Tessalon, Zithromax  Albuterol as needed only  Orders:  -     azithromycin (ZITHROMAX) 250 mg tablet; Take 2 tablets on day 1, then 1 tablet daily days 2 through 5  -     benzonatate (TESSALON) 200 MG capsule; Take 1 capsule (200 mg total) by mouth 3 (three) times a day as needed for cough  -     albuterol (Proventil HFA) 90 mcg/act inhaler; Inhale 2 puffs every 4 (four) hours as needed for wheezing or shortness of breath    2  Non-recurrent acute serous otitis media of right ear  Comments:  Findings consistent with otitis media  Zithromax  Orders:  -     azithromycin (ZITHROMAX) 250 mg tablet; Take 2 tablets on day 1, then 1 tablet daily days 2 through 5        COVID 19 Instructions    Chintan Flowers was advised to limit contact with others to essential tasks such as getting food, medications, and medical care  Proper handwashing reviewed, and Hand sanitzer when washing is not available  If the patient develops symptoms of COVID 19, the patient should call the office as soon as possible  For 2913-4605 Flu season, it is strongly recommended that Flu Vaccinations be obtained  Virtual Visits:  [employer]: This works on smart phones (any phone with Internet browsing capability)  You should get a text message when the provider is ready to see you  Click on the link in the text message, and the call should start  You will need to type in your name, and allow camera and microphone access  This is HIPPA compliant, and secure  If you have not already done so, get immunized to COVID 19  We are committed to getting you vaccinated as soon as possible and will be closely following CDC and [facility]  guidelines as they are released and revised    Please refer to our COVID-19 vaccine webpage for the most up to date information on the vaccine and our distribution efforts  This site will also have the most up to date recommendations for COVID booster vaccine  Da bateman    Call [phone] ([phone]), option 7    OUR NEW LOCATION:    [address], [address] [place], [place] [address], [place], [address]  Fax: [phone]    Lab services and OB/GYN are at this location as well
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
## 2023-06-27 ENCOUNTER — ANNUAL EXAM (OUTPATIENT)
Dept: OBGYN CLINIC | Facility: CLINIC | Age: 58
End: 2023-06-27
Payer: COMMERCIAL

## 2023-06-27 VITALS
SYSTOLIC BLOOD PRESSURE: 114 MMHG | BODY MASS INDEX: 26.21 KG/M2 | WEIGHT: 167 LBS | DIASTOLIC BLOOD PRESSURE: 70 MMHG | HEIGHT: 67 IN

## 2023-06-27 DIAGNOSIS — Z12.4 CERVICAL CANCER SCREENING: ICD-10-CM

## 2023-06-27 DIAGNOSIS — Z12.31 ENCOUNTER FOR SCREENING MAMMOGRAM FOR BREAST CANCER: ICD-10-CM

## 2023-06-27 DIAGNOSIS — Z01.419 ENCOUNTER FOR GYNECOLOGICAL EXAMINATION WITHOUT ABNORMAL FINDING: Primary | ICD-10-CM

## 2023-06-27 DIAGNOSIS — L30.9 VULVAR DERMATITIS: ICD-10-CM

## 2023-06-27 PROCEDURE — S0612 ANNUAL GYNECOLOGICAL EXAMINA: HCPCS | Performed by: NURSE PRACTITIONER

## 2023-06-27 NOTE — PROGRESS NOTES
Assessment / Plan    1  Encounter for gynecological examination without abnormal finding  Normal well woman exam  10/2021 pap/hpv negative  Repeat   Up to date on colonoscopy    2  Encounter for screening mammogram for breast cancer  Order placed for next year    - Mammo screening bilateral w 3d & cad; Future    3  Vulvar dermatitis  Refills sent    - triamcinolone (KENALOG) 0 1 % ointment; Apply topically 2 (two) times a day  Dispense: 30 g; Refill: 1          Subjective      Cristela Rodriguez is a 61 y o  female who presents for her annual gynecologic exam     Using triamcinolone as needed in labial area as needed  Needs refills  Last pap: 10/2021 pap/HPV negative  Pap Hx:  NL  STD hx: none  Sexually active: no  Last mammogram: 2023 negative  Colonoscopy, 10/2021, 3 yr update  Calcium intake: 4 servings  Exercise: irregular, 3x per week  Safety: feels safe    Periods are absent  Current contraception: post menopausal status  History of abnormal Pap smear: no  Family history of breast,uterine, ovarian or colon cancer: yes - PGM breast and colon ca    Menstrual History:  OB History        1    Para   0    Term   0       0    AB   0    Living   1       SAB   0    IAB   0    Ectopic   0    Multiple   0    Live Births   1           Obstetric Comments   Menarche 14  Menopause early 45s              No LMP recorded  Patient is postmenopausal        The following portions of the patient's history were reviewed and updated as appropriate: allergies, current medications, past family history, past medical history, past social history, past surgical history and problem list     Review of Systems      Review of Systems   Constitutional: Negative for chills and fever  Respiratory: Negative for cough and shortness of breath  Gastrointestinal: Negative for abdominal distention, abdominal pain, blood in stool, constipation, diarrhea, nausea and vomiting     Genitourinary: Negative for difficulty urinating, "dysuria, frequency, genital sores, hematuria, menstrual problem, pelvic pain, urgency, vaginal bleeding and vaginal discharge  Musculoskeletal: Negative for arthralgias and myalgias  Breasts:  Negative for skin changes, dimpling, asymmetry, nipple discharge, redness, tenderness or palpable masses      Objective      /70 (BP Location: Right arm, Patient Position: Sitting, Cuff Size: Standard)   Ht 5' 7\" (1 702 m)   Wt 75 8 kg (167 lb)   BMI 26 16 kg/m²   Physical Exam  Constitutional:       General: She is not in acute distress  Appearance: Normal appearance  She is well-developed  She is not ill-appearing or diaphoretic  Comments: bmi 26 2   HENT:      Head: Normocephalic and atraumatic  Eyes:      Pupils: Pupils are equal, round, and reactive to light  Neck:      Thyroid: No thyromegaly  Pulmonary:      Effort: Pulmonary effort is normal    Chest:   Breasts:     Breasts are symmetrical       Right: No inverted nipple, mass, nipple discharge, skin change or tenderness  Left: No inverted nipple, mass, nipple discharge, skin change or tenderness  Abdominal:      General: There is no distension  Palpations: Abdomen is soft  There is no mass  Tenderness: There is no abdominal tenderness  There is no guarding or rebound  Genitourinary:     General: Normal vulva  Exam position: Lithotomy position  Labia:         Right: No rash, tenderness, lesion or injury  Left: No rash, tenderness, lesion or injury  Vagina: No signs of injury and foreign body  No vaginal discharge, erythema, tenderness or bleeding  Cervix: No cervical motion tenderness, discharge or friability  Uterus: Not enlarged and not tender  Adnexa:         Right: No mass or tenderness  Left: No mass or tenderness  Musculoskeletal:      Cervical back: Neck supple  Lymphadenopathy:      Cervical: No cervical adenopathy        Upper Body:      Right upper body: No " supraclavicular adenopathy  Left upper body: No supraclavicular adenopathy  Skin:     General: Skin is warm and dry  Neurological:      General: No focal deficit present  Mental Status: She is alert and oriented to person, place, and time  Psychiatric:         Mood and Affect: Mood normal          Behavior: Behavior normal          Thought Content:  Thought content normal          Judgment: Judgment normal

## 2023-08-03 ENCOUNTER — APPOINTMENT (EMERGENCY)
Dept: RADIOLOGY | Facility: HOSPITAL | Age: 58
End: 2023-08-03
Payer: COMMERCIAL

## 2023-08-03 ENCOUNTER — HOSPITAL ENCOUNTER (EMERGENCY)
Facility: HOSPITAL | Age: 58
Discharge: HOME | End: 2023-08-03
Attending: EMERGENCY MEDICINE
Payer: COMMERCIAL

## 2023-08-03 VITALS
SYSTOLIC BLOOD PRESSURE: 148 MMHG | DIASTOLIC BLOOD PRESSURE: 86 MMHG | TEMPERATURE: 98 F | OXYGEN SATURATION: 97 % | RESPIRATION RATE: 20 BRPM | HEART RATE: 87 BPM

## 2023-08-03 DIAGNOSIS — J45.909 REACTIVE AIRWAY DISEASE: Primary | ICD-10-CM

## 2023-08-03 PROCEDURE — 94640 AIRWAY INHALATION TREATMENT: CPT

## 2023-08-03 PROCEDURE — 99283 EMERGENCY DEPT VISIT LOW MDM: CPT

## 2023-08-03 PROCEDURE — 71046 X-RAY EXAM CHEST 2 VIEWS: CPT

## 2023-08-03 PROCEDURE — 99284 EMERGENCY DEPT VISIT MOD MDM: CPT | Performed by: EMERGENCY MEDICINE

## 2023-08-03 RX ORDER — PREDNISONE 20 MG/1
TABLET ORAL
Qty: 7 TABLET | Refills: 0 | Status: SHIPPED | OUTPATIENT
Start: 2023-08-03 | End: 2023-08-09

## 2023-08-03 RX ORDER — IPRATROPIUM BROMIDE AND ALBUTEROL SULFATE 2.5; .5 MG/3ML; MG/3ML
3 SOLUTION RESPIRATORY (INHALATION) ONCE
Status: COMPLETED | OUTPATIENT
Start: 2023-08-03 | End: 2023-08-03

## 2023-08-03 RX ORDER — IPRATROPIUM BROMIDE AND ALBUTEROL SULFATE 2.5; .5 MG/3ML; MG/3ML
3 SOLUTION RESPIRATORY (INHALATION)
Status: DISCONTINUED | OUTPATIENT
Start: 2023-08-03 | End: 2023-08-03

## 2023-08-03 RX ORDER — PREDNISONE 20 MG/1
40 TABLET ORAL ONCE
Status: DISCONTINUED | OUTPATIENT
Start: 2023-08-03 | End: 2023-08-03 | Stop reason: HOSPADM

## 2023-08-03 RX ADMIN — IPRATROPIUM BROMIDE AND ALBUTEROL SULFATE 3 ML: 2.5; .5 SOLUTION RESPIRATORY (INHALATION) at 06:25

## 2023-08-03 NOTE — ED ATTENDING ATTESTATION
8/3/2023  IMac MD, saw and evaluated the patient. I have discussed the patient with the resident/non-physician practitioner and agree with the resident's/non-physician practitioner's findings, Plan of Care, and MDM as documented in the resident's/non-physician practitioner's note, except where noted. All available labs and Radiology studies were reviewed. I was present for key portions of any procedure(s) performed by the resident/non-physician practitioner and I was immediately available to provide assistance. At this point I agree with the current assessment done in the Emergency Department. I have conducted an independent evaluation of this patient a history and physical is as follows:    60 yo female with reactive airway disease presents to the emergency department for evaluation of cough. Patient reports ongoing cough and a sensation like she has trouble getting air out. She tried her albuterol inhaler without relief. She denies any associated palpitations, lightheadedness, diaphoresis, nausea or vomiting. No chest pain. No fevers or chills. Does have nasal congestion and rhinorrhea. No hemoptysis. No leg pain or swelling. On exam, patient anxious appearing, but in no acute distress, head is normocephalic atraumatic, pupils equal round and reactive to light, neck is supple without meningismus signs, heart is regular rate and rhythm without murmurs, lungs are clear to auscultation bilaterally, no peripheral edema. MDM:  Cough, shortness of breath: Differential diagnosis includes but is not limited to reactive airway disease, viral illness, anxiety, pneumonia, pneumothorax. Unlikely to represent arrhythmia or pulmonary embolism. Plan to get chest x-ray and treat with DuoNeb and steroids. Chest x-ray negative for any acute findings. Patient be discharged home with steroid taper.   She was advised to continue to use albuterol inhaler as needed and to follow-up with primary care physician.     ED Course         Critical Care Time  Procedures

## 2023-08-03 NOTE — ED PROVIDER NOTES
History  Chief Complaint   Patient presents with   • Cough     Patient has been coughing and having trouble getting air out. Patient took inhaler at home with no relief. -CP, -Dizzyness, +Headache. HPI see MDM    Prior to Admission Medications   Prescriptions Last Dose Informant Patient Reported? Taking?    Sodium Fluoride 5000 Sensitive 1.1-5 % GEL  Self Yes No   Sig: BRUSH DAILY AS DIRECTED.   albuterol (Proventil HFA) 90 mcg/act inhaler   No No   Sig: Inhale 2 puffs every 4 (four) hours as needed for wheezing or shortness of breath   diphenhydrAMINE (BENADRYL) 25 mg tablet  Self Yes No   Sig: Take 25 mg by mouth daily at bedtime as needed for itching   fluticasone (FLONASE) 50 mcg/act nasal spray  Self No No   Si sprays into each nostril daily   ketorolac (ACULAR) 0.4 % SOLN  Self No No   Sig: Administer 1 drop to both eyes 4 (four) times a day   loratadine (CLARITIN) 10 mg tablet  Self Yes No   Sig: Take 10 mg by mouth daily   montelukast (SINGULAIR) 10 mg tablet  Self No No   Sig: Take 1 tablet (10 mg total) by mouth daily at bedtime   triamcinolone (KENALOG) 0.1 % ointment   No No   Sig: Apply topically 2 (two) times a day      Facility-Administered Medications: None       Past Medical History:   Diagnosis Date   • Anxiety     2013  last assessed   • DVT (deep venous thrombosis) ([address])     [id number]  last assessed   • Fracture     closed fracture of the left distal tibia   • Menstrual migraine    • Pneumonia    • Reactive airway disease     2018 resolved   • Seasonal allergies        Past Surgical History:   Procedure Laterality Date   • CHOLECYSTECTOMY     • EYE SURGERY     • TONSILLECTOMY AND ADENOIDECTOMY         Family History   Problem Relation Age of Onset   • Hypertension Mother    • Diabetes Father    • Hypertension Father    • Lung cancer Father 79   • Thyroid disease Father    • Thyroid disease Brother    • Breast cancer Paternal Grandmother 80   • Colon cancer Paternal Grandmother 80 • Arthritis Family    • No Known Problems Maternal Grandmother    • No Known Problems Maternal Grandfather    • No Known Problems Paternal Grandfather    • Ovarian cancer Neg Hx    • Uterine cancer Neg Hx      I have reviewed and agree with the history as documented. E-Cigarette/Vaping   • E-Cigarette Use Never User      E-Cigarette/Vaping Substances   • Nicotine No    • THC No    • CBD No      Social History     Tobacco Use   • Smoking status: Never   • Smokeless tobacco: Never   Vaping Use   • Vaping Use: Never used   Substance Use Topics   • Alcohol use: Yes     Comment: Rarely   • Drug use: No        Review of Systems    Physical Exam  ED Triage Vitals [08/03/23 0553]   Temperature Pulse Respirations Blood Pressure SpO2   98 °F (36.7 °C) 87 20 148/86 97 %      Temp Source Heart Rate Source Patient Position - Orthostatic VS BP Location FiO2 (%)   Oral -- Sitting Left arm --      Pain Score       No Pain             Orthostatic Vital Signs  Vitals:    08/03/23 0553   BP: 148/86   Pulse: 87   Patient Position - Orthostatic VS: Sitting       Physical Exam    ED Medications  Medications   ipratropium-albuterol (DUO-NEB) 0.5-2.5 mg/3 mL inhalation solution 3 mL (3 mL Nebulization Given 8/3/23 0302)       Diagnostic Studies  Results Reviewed     None                 XR chest 2 views   ED Interpretation by Torres Christopher MD (08/03 3064)   No acute cardiopulmonary abnormality      Final Result by Sky Stevens MD (08/03 4918)      No radiographic evidence of acute intrathoracic process. Workstation performed: [id number]               Procedures  Procedures      ED Course                             SBIRT 20yo+    Flowsheet Row Most Recent Value   Initial Alcohol Screen: US AUDIT-C     1. How often do you have a drink containing alcohol?  1 Filed at: 08/03/2023 0556   Audit-C Score 1 Filed at: 08/03/2023 0635                MDM   Pt is a 59-year-old female, history of reactive airway disease, no official diagnosis of COPD or asthma, no history of smoking presents for " trouble getting air out ", and nonproductive cough. Tried her albuterol inhaler at home without much relief. Denies any associated chest pain, palpitations, pleuritic chest pain, nausea, vomiting, fever, chills, congestion, rhinorrhea, hemoptysis, leg swelling. On exam   General: VSS, NAD, awake, alert. Talking normally. Anxious looking  Head: Normocephalic, atraumatic, nontender. Eyes: EOM-No subconjunctival hemorrhages. ENT: Nose atraumatic. MMM  No malocclusion. No stridor. Normal phonation. No drooling. Normal swallowing. Neck: Trachea midline. No JVD. CV: RRR. Lungs: CTAB No tachypnea. No paradoxical motion. No wheezing heard  Abd: +BS, soft, NT/ND. No guarding/rigidity. MSK: Full ROM throughout. No lower extremity edema. Skin: Dry, intact. Neuro: AAOx3, GCS 15, CN II-XII grossly intact. Motor/sensory grossly intact. Psychiatric/Behavioral: mood/affect normal; behavior normal; thought content normal; judgement normal   Exam: deferred  Ddx: Reactive airway disease, allergies, viral illness, pneumonia, pneumothorax  Plan: Patient was evaluated with chest x-ray, hemodynamically stable, afebrile, normal pulse, not tachypneic   Imaging as interpreted by me chest x-ray normal, no acute cardiopulmonary process. ED Course: Patient had symptomatic relief with DuoNeb, discharged with outpatient follow-up. Final Dispo:Pt is hemodynamically stable and clear for discharge with outpatient f/u with their PCP.  Return precautions given pt verbalized understanding      Disposition  Final diagnoses:   Reactive airway disease     Time reflects when diagnosis was documented in both MDM as applicable and the Disposition within this note     Time User Action Codes Description Comment    8/3/2023  6:59 AM Chelsie Babb Add [J45.909] Reactive airway disease       ED Disposition     ED Disposition   Discharge Condition   Stable    Date/Time   Thu Aug 3, 2023  6:59 AM    Comment   Gedavidgerard Cavazos discharge to home/self care. Follow-up Information    None         Discharge Medication List as of 8/3/2023  7:00 AM      START taking these medications    Details   predniSONE 20 mg tablet Multiple Dosages:Starting Thu 8/3/2023, Until Fri 8/4/2023 at 2359, THEN Starting Sat 8/5/2023, Until Sun 8/6/2023 at 2359, THEN Starting Mon 8/7/2023, Until Tue 8/8/2023 at 2359Take 2 tablets (40 mg total) by mouth daily for 2 days, THEN 1 tablet (2 0 mg total) daily for 2 days, THEN 0.5 tablets (10 mg total) daily for 2 days. , Normal         CONTINUE these medications which have NOT CHANGED    Details   albuterol (Proventil HFA) 90 mcg/act inhaler Inhale 2 puffs every 4 (four) hours as needed for wheezing or shortness of breath, Starting Mon 6/26/2023, Normal      diphenhydrAMINE (BENADRYL) 25 mg tablet Take 25 mg by mouth daily at bedtime as needed for itching, Historical Med      fluticasone (FLONASE) 50 mcg/act nasal spray 2 sprays into each nostril daily, Starting Thu 6/15/2023, Until Wed 9/13/2023, Normal      ketorolac (ACULAR) 0.4 % SOLN Administer 1 drop to both eyes 4 (four) times a day, Starting Wed 4/26/2023, Normal      loratadine (CLARITIN) 10 mg tablet Take 10 mg by mouth daily, Historical Med      montelukast (SINGULAIR) 10 mg tablet Take 1 tablet (10 mg total) by mouth daily at bedtime, Starting Thu 6/15/2023, Normal      Sodium Fluoride 5000 Sensitive 1.1-5 % GEL BRUSH DAILY AS DIRECTED., Historical Med      triamcinolone (KENALOG) 0.1 % ointment Apply topically 2 (two) times a day, Starting Tue 6/27/2023, Normal      benzonatate (TESSALON) 200 MG capsule Take 1 capsule (200 mg total) by mouth 3 (three) times a day as needed for cough, Starting Mon 6/26/2023, Normal           No discharge procedures on file. PDMP Review     None           ED Provider  Attending physically available and evaluated Annetta Cavazos.  I managed the patient along with the ED Attending.     Electronically Signed by         Hernán Travis DO  08/06/23 7271

## 2023-08-04 ENCOUNTER — OFFICE VISIT (OUTPATIENT)
Dept: FAMILY MEDICINE CLINIC | Facility: CLINIC | Age: 58
End: 2023-08-04
Payer: COMMERCIAL

## 2023-08-04 VITALS
RESPIRATION RATE: 18 BRPM | HEIGHT: 67 IN | SYSTOLIC BLOOD PRESSURE: 128 MMHG | OXYGEN SATURATION: 98 % | DIASTOLIC BLOOD PRESSURE: 80 MMHG | HEART RATE: 78 BPM | BODY MASS INDEX: 26.06 KG/M2 | WEIGHT: 166 LBS

## 2023-08-04 DIAGNOSIS — J45.21 MILD INTERMITTENT REACTIVE AIRWAY DISEASE WITH ACUTE EXACERBATION: Primary | ICD-10-CM

## 2023-08-04 DIAGNOSIS — J30.2 SEASONAL ALLERGIC RHINITIS, UNSPECIFIED TRIGGER: ICD-10-CM

## 2023-08-04 PROCEDURE — 99214 OFFICE O/P EST MOD 30 MIN: CPT | Performed by: NURSE PRACTITIONER

## 2023-08-04 RX ORDER — BENZONATATE 200 MG/1
200 CAPSULE ORAL 3 TIMES DAILY PRN
Qty: 20 CAPSULE | Refills: 0 | Status: SHIPPED | OUTPATIENT
Start: 2023-08-04

## 2023-08-04 RX ORDER — FLUTICASONE PROPIONATE AND SALMETEROL 113; 14 UG/1; UG/1
1 POWDER, METERED RESPIRATORY (INHALATION) 2 TIMES DAILY
Qty: 1 EACH | Refills: 2 | Status: SHIPPED | OUTPATIENT
Start: 2023-08-04

## 2023-08-04 NOTE — PROGRESS NOTES
Name: Mary Arrington      : 1965      MRN: [id number]  Encounter Provider: FRANKIE Grayson  Encounter Date: 2023   Encounter department: [facility] PRIMARY CARE    Assessment & Plan     1. Mild intermittent reactive airway disease with acute exacerbation  Assessment & Plan:  PFTs were ordered to assess for any acute respiratory abnormalities as the patient has never been officially diagnosed with asthma or COPD. If PFTs are normal I suspect that the patient's symptoms are related to her allergies. In the meantime, the patient was started on AirDuo RespiClick twice daily to help with coughing fits and shortness of breath. Patient was advised to wash her mouth out with water after using the inhaler. Patient was advised that she can continue to use albuterol inhaler in between doses of AirDuo if necessary. She was also advised to continue prednisone taper as directed until completely finished. Tessalon Perles were also reordered to be used as needed for cough. Orders:  -     fluticasone-salmeterol (AIRDUO RESPICLICK) 024-99 mcg/act dry powder inhaler; Inhale 1 puff 2 (two) times a day Rinse mouth after use. -     Complete PFT with post bronchodilator; Future  -     benzonatate (TESSALON) 200 MG capsule; Take 1 capsule (200 mg total) by mouth 3 (three) times a day as needed for cough    2. Seasonal allergic rhinitis, unspecified trigger  Assessment & Plan:  Patient was advised to continue allergy medications as directed. Depression Screening and Follow-up Plan: Patient was screened for depression during today's encounter. They screened negative with a PHQ-2 score of 0. Subjective      RAD: The patient reports that she has been experiencing a recurrent cough over the past 4 months. Patient does have a noted history of reactive airway disease and per review of epic she was prescribed azithromycin, Tessalon Perles, and an albuterol inhaler in 2023 due to her symptoms. The patient was also evaluated in the ED yesterday due to her continued cough. The patient did have a chest x-ray completed at that time which was noted to be normal.  The patient was prescribed a prednisone taper, given a nebulizer treatment in the ED, and was then discharged to home. Patient is reporting continued symptoms of recurrent nonproductive cough and shortness of breath caused by her coughing fits. Patient denies any fever, chills, or URI symptoms. Allergic rhinitis: Patient is currently managed on Flonase, Claritin, and Singulair. Cough  This is a recurrent problem. The current episode started more than 1 year ago. The problem has been gradually worsening. The problem occurs every few hours. The cough is productive of sputum. Associated symptoms include ear congestion, nasal congestion and shortness of breath (due to coughing fits ). Pertinent negatives include no chest pain, chills, ear pain, fever, headaches, heartburn, hemoptysis, myalgias, postnasal drip, rash, rhinorrhea, sore throat, sweats, weight loss or wheezing. The symptoms are aggravated by dust and pollens. Risk factors for lung disease include animal exposure and occupational exposure. Her past medical history is significant for environmental allergies. Review of Systems   Constitutional: Negative for chills, fever and weight loss. HENT: Negative for congestion, ear pain, postnasal drip, rhinorrhea, sinus pressure, sinus pain and sore throat. Eyes: Negative for pain and visual disturbance. Respiratory: Positive for cough (non-productive-recurrent), chest tightness and shortness of breath (due to coughing fits ). Negative for hemoptysis and wheezing. Cardiovascular: Negative for chest pain, palpitations and leg swelling. Gastrointestinal: Negative for abdominal pain, constipation, diarrhea, heartburn, nausea and vomiting. Endocrine: Negative for cold intolerance and heat intolerance.    Genitourinary: Negative for decreased urine volume, dysuria and hematuria. Musculoskeletal: Negative for arthralgias, back pain and myalgias. Skin: Negative for color change and rash. Allergic/Immunologic: Positive for environmental allergies. Neurological: Negative for dizziness, seizures, syncope, weakness and headaches. Hematological: Negative for adenopathy. Psychiatric/Behavioral: Negative for confusion. The patient is not nervous/anxious. All other systems reviewed and are negative. Current Outpatient Medications on File Prior to Visit   Medication Sig   • albuterol (Proventil HFA) 90 mcg/act inhaler Inhale 2 puffs every 4 (four) hours as needed for wheezing or shortness of breath   • diphenhydrAMINE (BENADRYL) 25 mg tablet Take 25 mg by mouth daily at bedtime as needed for itching   • fluticasone (FLONASE) 50 mcg/act nasal spray 2 sprays into each nostril daily   • ketorolac (ACULAR) 0.4 % SOLN Administer 1 drop to both eyes 4 (four) times a day   • loratadine (CLARITIN) 10 mg tablet Take 10 mg by mouth daily   • montelukast (SINGULAIR) 10 mg tablet Take 1 tablet (10 mg total) by mouth daily at bedtime   • predniSONE 20 mg tablet Take 2 tablets (40 mg total) by mouth daily for 2 days, THEN 1 tablet (20 mg total) daily for 2 days, THEN 0.5 tablets (10 mg total) daily for 2 days. • Sodium Fluoride 5000 Sensitive 1.1-5 % GEL BRUSH DAILY AS DIRECTED. • triamcinolone (KENALOG) 0.1 % ointment Apply topically 2 (two) times a day   • [DISCONTINUED] benzonatate (TESSALON) 200 MG capsule Take 1 capsule (200 mg total) by mouth 3 (three) times a day as needed for cough       Objective     /80 (BP Location: Left arm, Patient Position: Sitting, Cuff Size: Large)   Pulse 78   Resp 18   Ht 5' 7" (1.702 m)   Wt 75.3 kg (166 lb)   SpO2 98%   BMI 26.00 kg/m²     Physical Exam  Vitals and nursing note reviewed. Constitutional:       General: She is not in acute distress. Appearance: Normal appearance.  She is not ill-appearing. HENT:      Head: Normocephalic. Eyes:      Conjunctiva/sclera: Conjunctivae normal.   Cardiovascular:      Rate and Rhythm: Normal rate and regular rhythm. Pulses: Normal pulses. Carotid pulses are 2+ on the right side and 2+ on the left side. Radial pulses are 2+ on the right side and 2+ on the left side. Posterior tibial pulses are 2+ on the right side and 2+ on the left side. Heart sounds: Normal heart sounds. No murmur heard. Pulmonary:      Effort: Pulmonary effort is normal. No respiratory distress. Breath sounds: Examination of the right-upper field reveals wheezing. Wheezing (expiratory-slight) present. No decreased breath sounds, rhonchi or rales. Abdominal:      General: Abdomen is flat. Bowel sounds are normal. There is no distension. Palpations: Abdomen is soft. Tenderness: There is no abdominal tenderness. There is no guarding. Musculoskeletal:         General: Normal range of motion. Cervical back: Normal range of motion. Right lower leg: No edema. Left lower leg: No edema. Skin:     General: Skin is warm and dry. Capillary Refill: Capillary refill takes less than 2 seconds. Neurological:      General: No focal deficit present. Mental Status: She is alert and oriented to person, place, and time. Psychiatric:         Mood and Affect: Mood normal.         Behavior: Behavior normal.         Thought Content:  Thought content normal.         Judgment: Judgment normal.       FRANKIE Erickson

## 2023-08-04 NOTE — ASSESSMENT & PLAN NOTE
PFTs were ordered to assess for any acute respiratory abnormalities as the patient has never been officially diagnosed with asthma or COPD. If PFTs are normal I suspect that the patient's symptoms are related to her allergies. In the meantime, the patient was started on AirDuo RespiClick twice daily to help with coughing fits and shortness of breath. Patient was advised to wash her mouth out with water after using the inhaler. Patient was advised that she can continue to use albuterol inhaler in between doses of AirDuo if necessary. She was also advised to continue prednisone taper as directed until completely finished. Tessalon Perles were also reordered to be used as needed for cough.

## 2023-08-16 ENCOUNTER — HOSPITAL ENCOUNTER (OUTPATIENT)
Dept: PULMONOLOGY | Facility: HOSPITAL | Age: 58
Discharge: HOME | End: 2023-08-16
Payer: COMMERCIAL

## 2023-08-16 ENCOUNTER — TELEPHONE (OUTPATIENT)
Dept: FAMILY MEDICINE CLINIC | Facility: CLINIC | Age: 58
End: 2023-08-16

## 2023-08-16 DIAGNOSIS — J45.21 MILD INTERMITTENT REACTIVE AIRWAY DISEASE WITH ACUTE EXACERBATION: ICD-10-CM

## 2023-08-16 PROCEDURE — 94060 EVALUATION OF WHEEZING: CPT | Performed by: STUDENT IN AN ORGANIZED HEALTH CARE EDUCATION/TRAINING PROGRAM

## 2023-08-16 PROCEDURE — 94729 DIFFUSING CAPACITY: CPT | Performed by: STUDENT IN AN ORGANIZED HEALTH CARE EDUCATION/TRAINING PROGRAM

## 2023-08-16 PROCEDURE — 94726 PLETHYSMOGRAPHY LUNG VOLUMES: CPT

## 2023-08-16 PROCEDURE — 94060 EVALUATION OF WHEEZING: CPT

## 2023-08-16 PROCEDURE — 94760 N-INVAS EAR/PLS OXIMETRY 1: CPT

## 2023-08-16 PROCEDURE — 94726 PLETHYSMOGRAPHY LUNG VOLUMES: CPT | Performed by: STUDENT IN AN ORGANIZED HEALTH CARE EDUCATION/TRAINING PROGRAM

## 2023-08-16 PROCEDURE — 94729 DIFFUSING CAPACITY: CPT

## 2023-08-16 RX ORDER — ALBUTEROL SULFATE 2.5 MG/3ML
2.5 SOLUTION RESPIRATORY (INHALATION) ONCE
Status: COMPLETED | OUTPATIENT
Start: 2023-08-16 | End: 2023-08-16

## 2023-08-16 RX ADMIN — ALBUTEROL SULFATE 2.5 MG: 2.5 SOLUTION RESPIRATORY (INHALATION) at 10:14

## 2023-08-16 NOTE — TELEPHONE ENCOUNTER
Pt calling to say that her fluticasone-salmeterol seems to be working. Pt stated she didn't take it for the tests she had to take today and can feel her coughing starting again, but does believe it is working.

## 2023-10-29 DIAGNOSIS — J45.21 MILD INTERMITTENT REACTIVE AIRWAY DISEASE WITH ACUTE EXACERBATION: ICD-10-CM

## 2023-10-30 RX ORDER — FLUTICASONE PROPIONATE AND SALMETEROL 113; 14 UG/1; UG/1
POWDER, METERED RESPIRATORY (INHALATION)
Qty: 1 EACH | Refills: 2 | Status: SHIPPED | OUTPATIENT
Start: 2023-10-30

## 2023-11-06 ENCOUNTER — OFFICE VISIT (OUTPATIENT)
Dept: FAMILY MEDICINE CLINIC | Facility: CLINIC | Age: 58
End: 2023-11-06
Payer: COMMERCIAL

## 2023-11-06 VITALS
HEIGHT: 67 IN | HEART RATE: 60 BPM | WEIGHT: 171.6 LBS | SYSTOLIC BLOOD PRESSURE: 104 MMHG | TEMPERATURE: 97.2 F | DIASTOLIC BLOOD PRESSURE: 60 MMHG | BODY MASS INDEX: 26.93 KG/M2 | RESPIRATION RATE: 14 BRPM

## 2023-11-06 DIAGNOSIS — J45.30 MILD PERSISTENT REACTIVE AIRWAY DISEASE WITHOUT COMPLICATION: Primary | ICD-10-CM

## 2023-11-06 DIAGNOSIS — E78.00 PURE HYPERCHOLESTEROLEMIA: ICD-10-CM

## 2023-11-06 PROCEDURE — 99213 OFFICE O/P EST LOW 20 MIN: CPT | Performed by: FAMILY MEDICINE

## 2023-11-06 NOTE — ASSESSMENT & PLAN NOTE
Previously, the patient has had problems from January till April. Recommend that she definitely use the air duo over the winter. Continue as needed albuterol. We will treat as if this is asthma, but did not have any findings on PFTs.

## 2023-11-06 NOTE — PROGRESS NOTES
Name: Chapincito Russell      : 1965      MRN: [id number]  Encounter Provider: Gela Casas MD  Encounter Date: 2023   Encounter department: [facility] PRIMARY CARE    Assessment & Plan     1. Mild persistent reactive airway disease without complication  Assessment & Plan:  Previously, the patient has had problems from January till April. Recommend that she definitely use the air duo over the winter. Continue as needed albuterol. We will treat as if this is asthma, but did not have any findings on PFTs. 2. Pure hypercholesterolemia  Assessment & Plan:  Patient's LDL went up from her last blood test.  She had lipid panel done in . Given her HDL, no treatment at the moment. Would prefer to repeat her panel before making any decisions with regard to medications. Certainly, try to limit fried foods, fatty foods, beef, pork. Depression Screening and Follow-up Plan: Patient was screened for depression during today's encounter. They screened negative with a PHQ-2 score of 0. Subjective      Chief Complaint   Patient presents with   • Follow-up     Patient in office for 3 months follow up       Patient is here to follow-up on her breathing. She is continuing to use AirDuo RespiClick. Not having any wheezing or shortness of breath. Has not needed the albuterol inhaler. She did have PFTs done in August, which were normal.          Review of Systems   Constitutional:  Negative for appetite change and fever. Respiratory:  Negative for chest tightness and shortness of breath. Cardiovascular:  Negative for chest pain, palpitations and leg swelling. Gastrointestinal:  Negative for abdominal pain. Genitourinary:  Negative for difficulty urinating. Musculoskeletal:  Negative for arthralgias and myalgias. Skin:  Negative for wound. Neurological:  Negative for dizziness, light-headedness and headaches.    Psychiatric/Behavioral:  Negative for dysphoric mood and sleep disturbance. The patient is not nervous/anxious. Current Outpatient Medications on File Prior to Visit   Medication Sig   • albuterol (Proventil HFA) 90 mcg/act inhaler Inhale 2 puffs every 4 (four) hours as needed for wheezing or shortness of breath   • benzonatate (TESSALON) 200 MG capsule Take 1 capsule (200 mg total) by mouth 3 (three) times a day as needed for cough   • diphenhydrAMINE (BENADRYL) 25 mg tablet Take 25 mg by mouth daily at bedtime as needed for itching   • fluticasone (FLONASE) 50 mcg/act nasal spray 2 sprays into each nostril daily   • fluticasone-salmeterol (AIRDUO RESPICLICK) 378-90 mcg/act dry powder inhaler inhale 1 puff twice a day RINSE MOUTH AND SPIT AFTER EACH USE   • ketorolac (ACULAR) 0.4 % SOLN Administer 1 drop to both eyes 4 (four) times a day   • loratadine (CLARITIN) 10 mg tablet Take 10 mg by mouth daily   • montelukast (SINGULAIR) 10 mg tablet Take 1 tablet (10 mg total) by mouth daily at bedtime   • Sodium Fluoride 5000 Sensitive 1.1-5 % GEL BRUSH DAILY AS DIRECTED. • triamcinolone (KENALOG) 0.1 % ointment Apply topically 2 (two) times a day       Objective     /60 (BP Location: Right arm, Patient Position: Sitting, Cuff Size: Adult)   Pulse 60   Temp (!) 97.2 °F (36.2 °C) (Temporal)   Resp 14   Ht 5' 7" (1.702 m)   Wt 77.8 kg (171 lb 9.6 oz)   BMI 26.88 kg/m²     Physical Exam  Vitals and nursing note reviewed. Constitutional:       Appearance: Normal appearance. Neurological:      Mental Status: She is alert.        Hunter Vazquez MD

## 2023-11-06 NOTE — ASSESSMENT & PLAN NOTE
Patient's LDL went up from her last blood test.  She had lipid panel done in June. Given her HDL, no treatment at the moment. Would prefer to repeat her panel before making any decisions with regard to medications. Certainly, try to limit fried foods, fatty foods, beef, pork.

## 2023-11-06 NOTE — PATIENT INSTRUCTIONS
1. Mild persistent reactive airway disease without complication  Assessment & Plan:  Previously, the patient has had problems from January till April. Recommend that she definitely use the air duo over the winter. Continue as needed albuterol. We will treat as if this is asthma, but did not have any findings on PFTs. 2. Pure hypercholesterolemia  Assessment & Plan:  Patient's LDL went up from her last blood test.  She had lipid panel done in June. Given her HDL, no treatment at the moment. Would prefer to repeat her panel before making any decisions with regard to medications. Certainly, try to limit fried foods, fatty foods, beef, pork. COVID 19 Instructions    Hosea Fleming was advised to limit contact with others to essential tasks such as getting food, medications, and medical care. Proper handwashing reviewed, and Hand sanitzer when washing is not available. If the patient develops symptoms of COVID 19, the patient should call the office as soon as possible. It is strongly recommended that Flu Vaccinations be obtained. Virtual Visits:  [place]: This works on smart phones (any phone with Internet browsing capability). You should get a text message when the provider is ready to see you. Click on the link in the text message, and the call should start. You will need to type in your name, and allow camera and microphone access. This is HIPPA compliant, and secure. If you have not already done so, get immunized to COVID 19. We are committed to getting you vaccinated as soon as possible and will be closely following CDC and [facility] guidelines as they are released and revised. Please refer to our COVID-19 vaccine webpage for the most up to date information on the vaccine and our distribution efforts.     This site will also have the most up to date recommendations for COVID booster vaccine. Da.tn    Call [phone]), option 7    You can also visit [email or web address] to find vaccines in your area. OUR LOCATION:    [facility]  [address] [facility], [address] [place] [address], [facility] [facility]  Fax: [phone]    Lab services, Rheumatology, and OB/GYN are at this location as well.

## 2023-12-15 DIAGNOSIS — J30.2 SEASONAL ALLERGIC RHINITIS, UNSPECIFIED TRIGGER: ICD-10-CM

## 2023-12-15 RX ORDER — MONTELUKAST SODIUM 10 MG/1
10 TABLET ORAL
Qty: 30 TABLET | Refills: 5 | Status: SHIPPED | OUTPATIENT
Start: 2023-12-15

## 2024-01-26 DIAGNOSIS — J45.21 MILD INTERMITTENT REACTIVE AIRWAY DISEASE WITH ACUTE EXACERBATION: ICD-10-CM

## 2024-01-26 RX ORDER — FLUTICASONE PROPIONATE AND SALMETEROL 113; 14 UG/1; UG/1
POWDER, METERED RESPIRATORY (INHALATION)
Qty: 1 EACH | Refills: 2 | Status: SHIPPED | OUTPATIENT
Start: 2024-01-26

## 2024-05-09 DIAGNOSIS — J45.21 MILD INTERMITTENT REACTIVE AIRWAY DISEASE WITH ACUTE EXACERBATION: ICD-10-CM

## 2024-05-09 RX ORDER — FLUTICASONE PROPIONATE AND SALMETEROL 113; 14 UG/1; UG/1
POWDER, METERED RESPIRATORY (INHALATION)
Qty: 1 EACH | Refills: 2 | Status: SHIPPED | OUTPATIENT
Start: 2024-05-09

## 2024-05-17 ENCOUNTER — TRANSCRIBE ORDERS (OUTPATIENT)
Dept: LAB | Facility: CLINIC | Age: 59
End: 2024-05-17

## 2024-05-17 ENCOUNTER — APPOINTMENT (OUTPATIENT)
Dept: LAB | Facility: CLINIC | Age: 59
End: 2024-05-17
Payer: COMMERCIAL

## 2024-05-17 DIAGNOSIS — Z00.8 HEALTH EXAMINATION IN POPULATION SURVEYS: Primary | ICD-10-CM

## 2024-05-17 DIAGNOSIS — Z00.8 HEALTH EXAMINATION IN POPULATION SURVEYS: ICD-10-CM

## 2024-05-17 LAB
CHOLEST SERPL-MCNC: 229 MG/DL
EST. AVERAGE GLUCOSE BLD GHB EST-MCNC: 117 MG/DL
HBA1C MFR BLD: 5.7 %
HDLC SERPL-MCNC: 96 MG/DL
LDLC SERPL CALC-MCNC: 113 MG/DL (ref 0–100)
NONHDLC SERPL-MCNC: 133 MG/DL
TRIGL SERPL-MCNC: 101 MG/DL

## 2024-05-17 PROCEDURE — 83036 HEMOGLOBIN GLYCOSYLATED A1C: CPT

## 2024-05-17 PROCEDURE — 80061 LIPID PANEL: CPT

## 2024-05-17 PROCEDURE — 36415 COLL VENOUS BLD VENIPUNCTURE: CPT

## 2024-05-20 ENCOUNTER — OFFICE VISIT (OUTPATIENT)
Dept: FAMILY MEDICINE CLINIC | Facility: CLINIC | Age: 59
End: 2024-05-20
Payer: COMMERCIAL

## 2024-05-20 VITALS
WEIGHT: 170.6 LBS | HEART RATE: 69 BPM | HEIGHT: 67 IN | DIASTOLIC BLOOD PRESSURE: 68 MMHG | SYSTOLIC BLOOD PRESSURE: 122 MMHG | BODY MASS INDEX: 26.78 KG/M2 | OXYGEN SATURATION: 97 %

## 2024-05-20 DIAGNOSIS — R73.09 ELEVATED HEMOGLOBIN A1C: ICD-10-CM

## 2024-05-20 DIAGNOSIS — H20.9 UVEITIS: ICD-10-CM

## 2024-05-20 DIAGNOSIS — J45.30 MILD PERSISTENT REACTIVE AIRWAY DISEASE WITHOUT COMPLICATION: ICD-10-CM

## 2024-05-20 DIAGNOSIS — E78.00 PURE HYPERCHOLESTEROLEMIA: Primary | ICD-10-CM

## 2024-05-20 PROCEDURE — 99214 OFFICE O/P EST MOD 30 MIN: CPT | Performed by: FAMILY MEDICINE

## 2024-05-20 RX ORDER — KETOROLAC TROMETHAMINE 4 MG/ML
1 SOLUTION/ DROPS OPHTHALMIC 4 TIMES DAILY
Qty: 5 ML | Refills: 2 | Status: SHIPPED | OUTPATIENT
Start: 2024-05-20

## 2024-05-20 NOTE — ASSESSMENT & PLAN NOTE
Increased redness of late, but increased allergy symptoms.  Will renew the Acular (ketorolac) eyedrops, but I would also recommend follow-up with ophthalmology.

## 2024-05-20 NOTE — ASSESSMENT & PLAN NOTE
LDL is minimally high, HDL is fantastic.  Total cholesterol again is mildly elevated.  Try to limit fried foods, fatty foods, beef, pork.  Otherwise, no specific changes recommended.  Recheck 1 year.  Would recommend comprehensive metabolic panel file.

## 2024-05-20 NOTE — ASSESSMENT & PLAN NOTE
Occasional cough, but much better than what it was previously.  Continues AirDuo, and Singulair.  Patient did have albuterol previously, used it last year, has not needed it since.  Would recommend that she have it available, but does not need to use it unless she is having wheezing or shortness of breath.

## 2024-05-20 NOTE — PATIENT INSTRUCTIONS
1. Pure hypercholesterolemia  Assessment & Plan:  LDL is minimally high, HDL is fantastic.  Total cholesterol again is mildly elevated.  Try to limit fried foods, fatty foods, beef, pork.  Otherwise, no specific changes recommended.  Recheck 1 year.  Would recommend comprehensive metabolic panel file.  Orders:  -     Comprehensive metabolic panel; Future; Expected date: 05/20/2024  -     Lipid panel; Future; Expected date: 05/20/2025  2. Mild persistent reactive airway disease without complication  Assessment & Plan:  Occasional cough, but much better than what it was previously.  Continues AirDuo, and Singulair.  Patient did have albuterol previously, used it last year, has not needed it since.  Would recommend that she have it available, but does not need to use it unless she is having wheezing or shortness of breath.  3. Uveitis  Assessment & Plan:  Increased redness of late, but increased allergy symptoms.  Will renew the Acular (ketorolac) eyedrops, but I would also recommend follow-up with ophthalmology.  Orders:  -     ketorolac (ACULAR) 0.4 % SOLN; Administer 1 drop to both eyes 4 (four) times a day  -     Ambulatory Referral to Ophthalmology; Future  4. Elevated hemoglobin A1c  Comments:  Not truly elevated, but rising from prior.  Try to limit carbohydrates a bit.  Check labs in the future with comprehensive metabolic profile.      COVID 19 Instructions    Nataly Perkinsgan was advised to limit contact with others to essential tasks such as getting food, medications, and medical care.    Proper handwashing reviewed, and Hand sanitzer when washing is not available.    If the patient develops symptoms of COVID 19, the patient should call the office as soon as possible.    It is strongly recommended that Flu Vaccinations be obtained.      Virtual Visits:  Aubree: This works on smart phones (any phone with Internet browsing capability).  You should get a text message when the provider is ready to see you.  Click on  the link in the text message, and the call should start.  You will need to type in your name, and allow camera and microphone access.  This is HIPPA compliant, and secure.      If you have not already done so, get immunized to COVID 19.      We are committed to getting you vaccinated as soon as possible and will be closely following [facility] and [facility] guidelines as they are released and revised.  Please refer to our COVID-19 vaccine webpage for the most up to date information on the vaccine and our distribution efforts.    This site will also have the most up to date recommendations for COVID booster vaccine.    https://[email or web address].org/covid-19/protect-[email or web address]-[email or web address]-vaccine    Call 7-043-VMRJECO ([phone]), option 7    You can also visit https://www.vaccines.gov/ to find vaccines in your area.    OUR LOCATION:    [facility]  [address] [place] [address], [address]  [place], [place], [address] [phone]  Fax: [phone]    Lab services, Rheumatology, and OB/GYN are at this location as well.

## 2024-05-20 NOTE — PROGRESS NOTES
Ambulatory Visit  Name: Nataly Shea      : 1965      MRN: [id number]  Encounter Provider: Helder Giordano MD  Encounter Date: 2024   Encounter department: [facility]    Assessment & Plan   1. Pure hypercholesterolemia  Assessment & Plan:  LDL is minimally high, HDL is fantastic.  Total cholesterol again is mildly elevated.  Try to limit fried foods, fatty foods, beef, pork.  Otherwise, no specific changes recommended.  Recheck 1 year.  Would recommend comprehensive metabolic panel file.  Orders:  -     Comprehensive metabolic panel; Future; Expected date: 2024  -     Lipid panel; Future; Expected date: 2025  2. Mild persistent reactive airway disease without complication  Assessment & Plan:  Occasional cough, but much better than what it was previously.  Continues AirDuo, and Singulair.  Patient did have albuterol previously, used it last year, has not needed it since.  Would recommend that she have it available, but does not need to use it unless she is having wheezing or shortness of breath.  3. Uveitis  Assessment & Plan:  Increased redness of late, but increased allergy symptoms.  Will renew the Acular (ketorolac) eyedrops, but I would also recommend follow-up with ophthalmology.  Orders:  -     ketorolac (ACULAR) 0.4 % SOLN; Administer 1 drop to both eyes 4 (four) times a day  -     Ambulatory Referral to Ophthalmology; Future  4. Elevated hemoglobin A1c  Comments:  Not truly elevated, but rising from prior.  Try to limit carbohydrates a bit.  Check labs in the future with comprehensive metabolic profile.       Chief Complaint   Patient presents with   • Follow-up     6 month fu       History of Present Illness     Patient is here to follow-up on multiple issues.    Cholesterol: Not on medications.  Reviewed labs.  Please see copies on the chart.    Hemoglobin A1c performed as part of a routine employee screening.    Uveitis: Has noted some issues on  "occasion.  Some itching lately, which could be related to allergies.  Some redness in the morning as well.  Has not been to ophthalmology as they stated to her previously that they would not follow-up with her unless you had reddened eyes at the time.    Reactive airways: Using AirDuo RespiClick, Singulair.  Doing extremely well so far.  Has not needed to use albuterol in quite a while.        Review of Systems   Constitutional: Negative.    HENT: Negative.     Eyes: Negative.    Respiratory: Negative.     Cardiovascular: Negative.    Gastrointestinal: Negative.    Endocrine: Negative.    Genitourinary: Negative.    Musculoskeletal: Negative.    Skin: Negative.    Allergic/Immunologic: Negative.    Neurological: Negative.    Hematological: Negative.    Psychiatric/Behavioral: Negative.         Objective     /68 (BP Location: Left arm, Patient Position: Sitting, Cuff Size: Standard)   Pulse 69   Ht 5' 7\" (1.702 m)   Wt 77.4 kg (170 lb 9.6 oz)   SpO2 97%   BMI 26.72 kg/m²     Total cholesterol 229, , HDL 96, triglycerides 101.    Physical Exam  Vitals and nursing note reviewed.   Constitutional:       Appearance: Normal appearance. She is well-developed.   HENT:      Head: Normocephalic and atraumatic.   Cardiovascular:      Rate and Rhythm: Normal rate and regular rhythm.      Pulses:           Carotid pulses are 2+ on the right side and 2+ on the left side.     Heart sounds: Normal heart sounds.   Pulmonary:      Effort: Pulmonary effort is normal.      Breath sounds: Normal breath sounds. No wheezing or rales.   Chest:      Chest wall: No tenderness.   Neurological:      Mental Status: She is alert.       Administrative Statements     "

## 2024-05-21 ENCOUNTER — APPOINTMENT (OUTPATIENT)
Dept: LAB | Facility: CLINIC | Age: 59
End: 2024-05-21
Payer: COMMERCIAL

## 2024-05-21 DIAGNOSIS — E78.00 PURE HYPERCHOLESTEROLEMIA: ICD-10-CM

## 2024-05-21 LAB
ALBUMIN SERPL BCP-MCNC: 4.1 G/DL (ref 3.5–5)
ALP SERPL-CCNC: 65 U/L (ref 34–104)
ALT SERPL W P-5'-P-CCNC: 12 U/L (ref 7–52)
ANION GAP SERPL CALCULATED.3IONS-SCNC: 6 MMOL/L (ref 4–13)
AST SERPL W P-5'-P-CCNC: 15 U/L (ref 13–39)
BILIRUB SERPL-MCNC: 0.61 MG/DL (ref 0.2–1)
BUN SERPL-MCNC: 21 MG/DL (ref 5–25)
CALCIUM SERPL-MCNC: 9.2 MG/DL (ref 8.4–10.2)
CHLORIDE SERPL-SCNC: 103 MMOL/L (ref 96–108)
CHOLEST SERPL-MCNC: 212 MG/DL
CO2 SERPL-SCNC: 31 MMOL/L (ref 21–32)
CREAT SERPL-MCNC: 0.75 MG/DL (ref 0.6–1.3)
GFR SERPL CREATININE-BSD FRML MDRD: 87 ML/MIN/1.73SQ M
GLUCOSE P FAST SERPL-MCNC: 95 MG/DL (ref 65–99)
HDLC SERPL-MCNC: 83 MG/DL
LDLC SERPL CALC-MCNC: 112 MG/DL (ref 0–100)
NONHDLC SERPL-MCNC: 129 MG/DL
POTASSIUM SERPL-SCNC: 4 MMOL/L (ref 3.5–5.3)
PROT SERPL-MCNC: 7 G/DL (ref 6.4–8.4)
SODIUM SERPL-SCNC: 140 MMOL/L (ref 135–147)
TRIGL SERPL-MCNC: 87 MG/DL

## 2024-05-21 PROCEDURE — 36415 COLL VENOUS BLD VENIPUNCTURE: CPT

## 2024-05-21 PROCEDURE — 80061 LIPID PANEL: CPT

## 2024-05-21 PROCEDURE — 80053 COMPREHEN METABOLIC PANEL: CPT

## 2024-06-07 ENCOUNTER — HOSPITAL ENCOUNTER (OUTPATIENT)
Dept: MAMMOGRAPHY | Facility: MEDICAL CENTER | Age: 59
Discharge: HOME | End: 2024-06-07
Payer: COMMERCIAL

## 2024-06-07 VITALS — HEIGHT: 67 IN | WEIGHT: 170 LBS | BODY MASS INDEX: 26.68 KG/M2

## 2024-06-07 DIAGNOSIS — Z12.31 ENCOUNTER FOR SCREENING MAMMOGRAM FOR BREAST CANCER: ICD-10-CM

## 2024-06-07 PROCEDURE — 77063 BREAST TOMOSYNTHESIS BI: CPT

## 2024-06-07 PROCEDURE — 77067 SCR MAMMO BI INCL CAD: CPT

## 2024-06-15 DIAGNOSIS — J30.2 SEASONAL ALLERGIC RHINITIS, UNSPECIFIED TRIGGER: ICD-10-CM

## 2024-06-15 RX ORDER — MONTELUKAST SODIUM 10 MG/1
10 TABLET ORAL
Qty: 30 TABLET | Refills: 5 | Status: SHIPPED | OUTPATIENT
Start: 2024-06-15

## 2024-08-20 DIAGNOSIS — J45.21 MILD INTERMITTENT REACTIVE AIRWAY DISEASE WITH ACUTE EXACERBATION: ICD-10-CM

## 2024-08-20 RX ORDER — FLUTICASONE PROPIONATE AND SALMETEROL 113; 14 UG/1; UG/1
POWDER, METERED RESPIRATORY (INHALATION)
Qty: 1 EACH | Refills: 5 | Status: SHIPPED | OUTPATIENT
Start: 2024-08-20

## 2024-10-08 ENCOUNTER — TELEPHONE (OUTPATIENT)
Dept: GASTROENTEROLOGY | Facility: MEDICAL CENTER | Age: 59
End: 2024-10-08

## 2024-10-08 ENCOUNTER — PREP FOR PROCEDURE (OUTPATIENT)
Dept: GASTROENTEROLOGY | Facility: MEDICAL CENTER | Age: 59
End: 2024-10-08

## 2024-10-08 DIAGNOSIS — Z86.0100 HISTORY OF COLON POLYPS: Primary | ICD-10-CM

## 2024-10-08 NOTE — TELEPHONE ENCOUNTER
Procedure: Colonoscopy  Date: 01/24/2025  Physician performing: Dr. Jaiyeola  Location of procedure:  [place]  Instructions given to patient: Dul/Miralax  Diabetic: N/A  Clearances: N/A     Patient has scheduled from recall   Did send prep instructions to patient via [employer] for patient to review

## 2024-12-13 ENCOUNTER — OFFICE VISIT (OUTPATIENT)
Dept: FAMILY MEDICINE CLINIC | Facility: CLINIC | Age: 59
End: 2024-12-13
Payer: COMMERCIAL

## 2024-12-13 ENCOUNTER — APPOINTMENT (OUTPATIENT)
Dept: RADIOLOGY | Facility: MEDICAL CENTER | Age: 59
End: 2024-12-13
Payer: COMMERCIAL

## 2024-12-13 VITALS
HEIGHT: 67 IN | DIASTOLIC BLOOD PRESSURE: 80 MMHG | HEART RATE: 66 BPM | OXYGEN SATURATION: 100 % | WEIGHT: 173 LBS | BODY MASS INDEX: 27.15 KG/M2 | SYSTOLIC BLOOD PRESSURE: 110 MMHG

## 2024-12-13 DIAGNOSIS — R10.2 PELVIC PAIN: Primary | ICD-10-CM

## 2024-12-13 DIAGNOSIS — J30.2 SEASONAL ALLERGIC RHINITIS, UNSPECIFIED TRIGGER: ICD-10-CM

## 2024-12-13 DIAGNOSIS — R10.2 PELVIC PAIN: ICD-10-CM

## 2024-12-13 LAB
SL AMB  POCT GLUCOSE, UA: NEGATIVE
SL AMB LEUKOCYTE ESTERASE,UA: NEGATIVE
SL AMB POCT BILIRUBIN,UA: NEGATIVE
SL AMB POCT BLOOD,UA: NEGATIVE
SL AMB POCT CLARITY,UA: CLEAR
SL AMB POCT COLOR,UA: YELLOW
SL AMB POCT KETONES,UA: NEGATIVE
SL AMB POCT NITRITE,UA: NEGATIVE
SL AMB POCT PH,UA: 5
SL AMB POCT SPECIFIC GRAVITY,UA: 1.02
SL AMB POCT URINE PROTEIN: NEGATIVE
SL AMB POCT UROBILINOGEN: 0.2

## 2024-12-13 PROCEDURE — 99214 OFFICE O/P EST MOD 30 MIN: CPT | Performed by: NURSE PRACTITIONER

## 2024-12-13 PROCEDURE — 81002 URINALYSIS NONAUTO W/O SCOPE: CPT | Performed by: NURSE PRACTITIONER

## 2024-12-13 PROCEDURE — 73521 X-RAY EXAM HIPS BI 2 VIEWS: CPT

## 2024-12-13 NOTE — PROGRESS NOTES
"Name: Nataly Shea      : 1965      MRN: [id number]  Encounter Provider: FRANKIE Sharp  Encounter Date: 2024   Encounter department: [facility]  :  Assessment & Plan  Pelvic pain  X-ray of the bilateral hips and pelvis were ordered to assess for musculoskeletal causes of the patient's pain.  If the x-ray is normal and the pain continues I will have the patient complete a transvaginal ultrasound to assess for causes of the symptoms.  In the meantime, she was advised to continue using Tylenol, ibuprofen, and ice as needed for her pain.  Orders:  •  XR hips bilateral 5+ w pelvis if performed; Future  •  POCT urine dip    Seasonal allergic rhinitis, unspecified trigger  Well-controlled on current regimen.             Depression Screening and Follow-up Plan: Patient was screened for depression during today's encounter. They screened negative with a PHQ-2 score of 0.      History of Present Illness     Pelvic pain: Patient reports she has been experiencing recurrent pelvic pain over the past few days.  She reports that the pain seems to be \"deep\" in her body.  She denies noting any urinary changes or vaginal bleeding or discharge.  She denies any recent falls but does report that she has been lifting heavy objects at work recently.  Urine dip performed in the office today was normal.    Allergic rhinitis: Well-controlled on current regimen.      Review of Systems   Constitutional:  Negative for chills and fever.   HENT:  Negative for ear pain and sore throat.    Eyes:  Negative for pain and visual disturbance.   Respiratory:  Negative for cough, chest tightness, shortness of breath and wheezing.    Cardiovascular:  Negative for chest pain, palpitations and leg swelling.   Gastrointestinal:  Negative for abdominal pain, constipation, diarrhea, nausea and vomiting.   Endocrine: Negative for cold intolerance and heat intolerance.   Genitourinary:  Positive for pelvic pain. " "Negative for decreased urine volume, difficulty urinating, dyspareunia, dysuria, enuresis, flank pain, frequency, genital sores, hematuria, menstrual problem, urgency, vaginal bleeding, vaginal discharge and vaginal pain.   Musculoskeletal:  Negative for arthralgias, back pain and myalgias.   Skin:  Negative for color change and rash.   Allergic/Immunologic: Negative for environmental allergies.   Neurological:  Negative for dizziness, seizures, syncope, weakness, light-headedness, numbness and headaches.   Hematological:  Negative for adenopathy.   Psychiatric/Behavioral:  Negative for confusion. The patient is not nervous/anxious.    All other systems reviewed and are negative.      Objective   /80 (BP Location: Right arm, Patient Position: Sitting, Cuff Size: Standard)   Pulse 66   Ht 5' 7\" (1.702 m)   Wt 78.5 kg (173 lb)   SpO2 100%   BMI 27.10 kg/m²      Physical Exam  Vitals and nursing note reviewed.   Constitutional:       General: She is not in acute distress.     Appearance: Normal appearance. She is well-developed. She is not ill-appearing.   HENT:      Head: Normocephalic.   Eyes:      Conjunctiva/sclera: Conjunctivae normal.   Cardiovascular:      Rate and Rhythm: Normal rate and regular rhythm.      Pulses: Normal pulses.           Carotid pulses are 2+ on the right side and 2+ on the left side.       Radial pulses are 2+ on the right side and 2+ on the left side.        Posterior tibial pulses are 2+ on the right side and 2+ on the left side.      Heart sounds: Normal heart sounds. No murmur heard.  Pulmonary:      Effort: Pulmonary effort is normal. No respiratory distress.      Breath sounds: Normal breath sounds. No decreased breath sounds, wheezing, rhonchi or rales.   Abdominal:      General: Abdomen is flat. Bowel sounds are normal. There is no distension.      Palpations: Abdomen is soft.      Tenderness: There is no abdominal tenderness. There is no right CVA tenderness, left CVA " tenderness or guarding.   Musculoskeletal:         General: No swelling. Normal range of motion.      Cervical back: Normal range of motion and neck supple.      Right lower leg: No edema.      Left lower leg: No edema.   Skin:     General: Skin is warm and dry.      Capillary Refill: Capillary refill takes less than 2 seconds.   Neurological:      General: No focal deficit present.      Mental Status: She is alert and oriented to person, place, and time.   Psychiatric:         Mood and Affect: Mood normal.         Behavior: Behavior normal.         Thought Content: Thought content normal.         Judgment: Judgment normal.

## 2024-12-13 NOTE — ASSESSMENT & PLAN NOTE
X-ray of the bilateral hips and pelvis were ordered to assess for musculoskeletal causes of the patient's pain.  If the x-ray is normal and the pain continues I will have the patient complete a transvaginal ultrasound to assess for causes of the symptoms.  In the meantime, she was advised to continue using Tylenol, ibuprofen, and ice as needed for her pain.  Orders:  •  XR hips bilateral 5+ w pelvis if performed; Future  •  POCT urine dip

## 2024-12-16 ENCOUNTER — RESULTS FOLLOW-UP (OUTPATIENT)
Dept: FAMILY MEDICINE CLINIC | Facility: CLINIC | Age: 59
End: 2024-12-16

## 2024-12-16 DIAGNOSIS — R10.2 PELVIC PAIN: Primary | ICD-10-CM

## 2024-12-20 ENCOUNTER — HOSPITAL ENCOUNTER (OUTPATIENT)
Dept: RADIOLOGY | Facility: HOSPITAL | Age: 59
Discharge: HOME | End: 2024-12-20
Payer: COMMERCIAL

## 2024-12-20 DIAGNOSIS — R10.2 PELVIC PAIN: ICD-10-CM

## 2024-12-20 PROCEDURE — 76830 TRANSVAGINAL US NON-OB: CPT

## 2024-12-20 PROCEDURE — 76856 US EXAM PELVIC COMPLETE: CPT

## 2024-12-23 ENCOUNTER — RESULTS FOLLOW-UP (OUTPATIENT)
Dept: FAMILY MEDICINE CLINIC | Facility: CLINIC | Age: 59
End: 2024-12-23

## 2024-12-23 DIAGNOSIS — J30.2 SEASONAL ALLERGIC RHINITIS, UNSPECIFIED TRIGGER: ICD-10-CM

## 2024-12-23 DIAGNOSIS — N94.89 ADNEXAL MASS: Primary | ICD-10-CM

## 2024-12-24 RX ORDER — MONTELUKAST SODIUM 10 MG/1
10 TABLET ORAL
Qty: 30 TABLET | Refills: 5 | Status: SHIPPED | OUTPATIENT
Start: 2024-12-24

## 2024-12-30 ENCOUNTER — TELEPHONE (OUTPATIENT)
Dept: GASTROENTEROLOGY | Facility: MEDICAL CENTER | Age: 59
End: 2024-12-30

## 2025-01-07 ENCOUNTER — APPOINTMENT (OUTPATIENT)
Dept: LAB | Facility: CLINIC | Age: 60
End: 2025-01-07
Payer: COMMERCIAL

## 2025-01-07 ENCOUNTER — CONSULT (OUTPATIENT)
Dept: GYNECOLOGIC ONCOLOGY | Facility: CLINIC | Age: 60
End: 2025-01-07
Payer: COMMERCIAL

## 2025-01-07 ENCOUNTER — RESULTS FOLLOW-UP (OUTPATIENT)
Dept: GYNECOLOGIC ONCOLOGY | Facility: CLINIC | Age: 60
End: 2025-01-07

## 2025-01-07 ENCOUNTER — LAB REQUISITION (OUTPATIENT)
Dept: LAB | Facility: HOSPITAL | Age: 60
End: 2025-01-07
Payer: COMMERCIAL

## 2025-01-07 VITALS
HEART RATE: 74 BPM | BODY MASS INDEX: 27.94 KG/M2 | RESPIRATION RATE: 16 BRPM | DIASTOLIC BLOOD PRESSURE: 86 MMHG | OXYGEN SATURATION: 98 % | WEIGHT: 178 LBS | HEIGHT: 67 IN | SYSTOLIC BLOOD PRESSURE: 126 MMHG

## 2025-01-07 DIAGNOSIS — N94.89 OTHER SPECIFIED CONDITIONS ASSOCIATED WITH FEMALE GENITAL ORGANS AND MENSTRUAL CYCLE: ICD-10-CM

## 2025-01-07 DIAGNOSIS — N94.89 ADNEXAL MASS: ICD-10-CM

## 2025-01-07 LAB
ABO GROUP BLD: NORMAL
ALBUMIN SERPL BCG-MCNC: 4.3 G/DL (ref 3.5–5)
ALP SERPL-CCNC: 81 U/L (ref 34–104)
ALT SERPL W P-5'-P-CCNC: 12 U/L (ref 7–52)
ANION GAP SERPL CALCULATED.3IONS-SCNC: 6 MMOL/L (ref 4–13)
APTT PPP: 23 SECONDS (ref 23–34)
AST SERPL W P-5'-P-CCNC: 15 U/L (ref 13–39)
BILIRUB SERPL-MCNC: 0.39 MG/DL (ref 0.2–1)
BLD GP AB SCN SERPL QL: NEGATIVE
BUN SERPL-MCNC: 18 MG/DL (ref 5–25)
CALCIUM SERPL-MCNC: 9.3 MG/DL (ref 8.4–10.2)
CANCER AG125 SERPL-ACNC: 9.6 U/ML (ref 0–35)
CHLORIDE SERPL-SCNC: 105 MMOL/L (ref 96–108)
CO2 SERPL-SCNC: 29 MMOL/L (ref 21–32)
CREAT SERPL-MCNC: 0.73 MG/DL (ref 0.6–1.3)
ERYTHROCYTE [DISTWIDTH] IN BLOOD BY AUTOMATED COUNT: 12.8 % (ref 11.6–15.1)
EST. AVERAGE GLUCOSE BLD GHB EST-MCNC: 111 MG/DL
GFR SERPL CREATININE-BSD FRML MDRD: 89 ML/MIN/1.73SQ M
GLUCOSE SERPL-MCNC: 97 MG/DL (ref 65–140)
HBA1C MFR BLD: 5.5 %
HCT VFR BLD AUTO: 40.9 % (ref 34.8–46.1)
HGB BLD-MCNC: 13.4 G/DL (ref 11.5–15.4)
INR PPP: 0.9 (ref 0.85–1.19)
MCH RBC QN AUTO: 30.3 PG (ref 26.8–34.3)
MCHC RBC AUTO-ENTMCNC: 32.8 G/DL (ref 31.4–37.4)
MCV RBC AUTO: 93 FL (ref 82–98)
PLATELET # BLD AUTO: 266 THOUSANDS/UL (ref 149–390)
PMV BLD AUTO: 10 FL (ref 8.9–12.7)
POTASSIUM SERPL-SCNC: 4.1 MMOL/L (ref 3.5–5.3)
PROT SERPL-MCNC: 7.6 G/DL (ref 6.4–8.4)
PROTHROMBIN TIME: 12.8 SECONDS (ref 12.3–15)
RBC # BLD AUTO: 4.42 MILLION/UL (ref 3.81–5.12)
RH BLD: POSITIVE
SODIUM SERPL-SCNC: 140 MMOL/L (ref 135–147)
SPECIMEN EXPIRATION DATE: NORMAL
WBC # BLD AUTO: 6.37 THOUSAND/UL (ref 4.31–10.16)

## 2025-01-07 PROCEDURE — 85610 PROTHROMBIN TIME: CPT

## 2025-01-07 PROCEDURE — 99459 PELVIC EXAMINATION: CPT | Performed by: OBSTETRICS & GYNECOLOGY

## 2025-01-07 PROCEDURE — 36415 COLL VENOUS BLD VENIPUNCTURE: CPT

## 2025-01-07 PROCEDURE — 80053 COMPREHEN METABOLIC PANEL: CPT

## 2025-01-07 PROCEDURE — 86900 BLOOD TYPING SEROLOGIC ABO: CPT | Performed by: OBSTETRICS & GYNECOLOGY

## 2025-01-07 PROCEDURE — 86901 BLOOD TYPING SEROLOGIC RH(D): CPT | Performed by: OBSTETRICS & GYNECOLOGY

## 2025-01-07 PROCEDURE — 99244 OFF/OP CNSLTJ NEW/EST MOD 40: CPT | Performed by: OBSTETRICS & GYNECOLOGY

## 2025-01-07 PROCEDURE — 86850 RBC ANTIBODY SCREEN: CPT | Performed by: OBSTETRICS & GYNECOLOGY

## 2025-01-07 PROCEDURE — 86304 IMMUNOASSAY TUMOR CA 125: CPT

## 2025-01-07 PROCEDURE — 83036 HEMOGLOBIN GLYCOSYLATED A1C: CPT

## 2025-01-07 PROCEDURE — 85027 COMPLETE CBC AUTOMATED: CPT

## 2025-01-07 PROCEDURE — 85730 THROMBOPLASTIN TIME PARTIAL: CPT

## 2025-01-07 RX ORDER — LIDOCAINE HYDROCHLORIDE 10 MG/ML
0.5 INJECTION, SOLUTION EPIDURAL; INFILTRATION; INTRACAUDAL; PERINEURAL ONCE AS NEEDED
OUTPATIENT
Start: 2025-01-07

## 2025-01-07 RX ORDER — GABAPENTIN 100 MG/1
100 CAPSULE ORAL ONCE
OUTPATIENT
Start: 2025-01-07 | End: 2025-01-07

## 2025-01-07 RX ORDER — ACETAMINOPHEN 325 MG/1
975 TABLET ORAL ONCE
OUTPATIENT
Start: 2025-01-07 | End: 2025-01-07

## 2025-01-07 NOTE — PROGRESS NOTES
Name: Nataly Shea      : 1965      MRN: [id number]  Encounter Provider: Lorena Michael MD  Encounter Date: 2025   Encounter department: [facility]  :  Assessment & Plan  Adnexal mass  We discussed that this adnexal mass could represent a benign, borderline or malignant growth. Her imaging demonstrates a 3.3x2.2cm solid appearing left ovarian cyst and 2.7x1.8cm irregular septated solid left ovarian cyst.    I recommend surgical management with removal of the pelvic mass via a laparoscopic approach with laparoscopic BSO and frozen section. Reviewed that frozen section could reveal benign, borderline or malignant features. If benign, plan would be laparoscopic BSO only. If a borderline change is suspected on frozen, discussed the risk of upstaging to a melania malignancy, but that more likely the diagnosis would remain a borderline. Given the small risk of upstaging, we would perform a total laparoscopic hysterectomy, omental biopsy and biopsy of any peritoneal lesions. If an ovarian neoplasm is detected on frozen, we would recommend full staging which would include a TLH/BSO, pelvic washings, an omental biopsy, and a biopsy of any peritoneal lesions, and pelvic and para-aortic lymphadenectomy. We also discussed the possible need for mini-laparotomy for specimen removal if the ovarian mass cannot be removed via a laparoscopic approach.      Further workup will be done with tumor marker with Ca-125. If elevated, we will obtain a preoperative CT CAP.    All questions answered and surgical consents signed. Will obtain pre-operative labs and EKG. Can continue home medications per anesthesia recs.      Orders:    Ambulatory Referral to Gynecologic Oncology    Type and screen; Future    Comprehensive metabolic panel; Future    CBC and Platelet; Future    APTT; Future    Protime-INR; Future    HEMOGLOBIN A1C W/ EAG ESTIMATION; Future    EKG 12 lead; Future    ;  Future    Case request operating room: EXAM UNDER ANESTHESIA (EUA), SALPINGO-OOPHORECTOMY, LAPAROSCOPIC, HYSTERECTOMY LAPAROSCOPIC TOTAL (LTH) WITH SALPINGO-OOPHERECTOMY, DISSECTION/STAGING LYMPH NODE LAPAROSCOPIC PELVIS/ABDOMEN; Standing      History of Present Illness   Reason for Visit / CC:   Adnexal mass  Nataly Shea is a 59 y.o. female with PMH reactive airway disease (AirDuo, singulair, PRN albuterol), hx uveitis presenting with complex left adnexal cysts.    The patient reports experiencing pelvic pain starting in early December which she describes as feeling almost like a muscle/ligament pull near her pubic symphysis on the left side. 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 She presented to her PCP Dr. Watson who ordered a UA (negative), hip xray (negative), and pelvis US which demonstrated a 3.3x2.2cm solid appearing left ovarian cyst and 2.7x1.8cm irregular septated solid left ovarian cyst.     She reports that her prior presenting pain only lasted a few days and now has resolved. She denies abdominal/pelvic pain, early satiety/bloating, nausea/emesis, unanticipated weight loss, or changes in urinary/bowel habits and otherwise feels well. She went through menopause in her early 40s with no issues.    Screening:  - Cervix: remote hx abnormal, last pap NILM/HRHPV neg in 2021  - CRC: colonoscopy scheduled 1/2025  - Breast: 6/2024 mammogram BIRADS 2    Review of Systems   Constitutional:  Negative for chills and fever.   HENT:  Negative for ear pain and sore throat.    Eyes:  Negative for pain and visual disturbance.   Respiratory:  Negative for cough and shortness of breath.    Cardiovascular:  Negative for chest pain and palpitations.   Gastrointestinal:  Negative for abdominal pain and vomiting.   Genitourinary:  Negative for dysuria, hematuria, pelvic pain, urgency, vaginal bleeding and vaginal discharge.   Musculoskeletal:   "Negative for arthralgias and back pain.   Skin:  Negative for color change and rash.   Neurological:  Negative for seizures and syncope.   All other systems reviewed and are negative.   A complete review of systems is negative other than that noted above in the HPI.  Past Medical History   Past Medical History:   Diagnosis Date    Abnormal Pap smear of cervix     Allergic     \"Since i was a child\", most recent normal    Anxiety     07oct2013  last assessed    DVT (deep venous thrombosis) ([facility])     12aug2014  last assessed    Fracture     closed fracture of the left distal tibia    GERD (gastroesophageal reflux disease)     With certain fruits, garlic or [employer].    Headache(784.0)     Rare headaches.  Occasional migraine.    Menstrual migraine     Pneumonia     Reactive airway disease     05jan2018 resolved    Seasonal allergies     Visual impairment Friday    Possibly allergic conjunctivitis?     Past Surgical History:   Procedure Laterality Date    CHOLECYSTECTOMY      COLONOSCOPY      EYE SURGERY      TONSILLECTOMY AND ADENOIDECTOMY       Family History   Problem Relation Age of Onset    Hypertension Mother     Diabetes Father     Hypertension Father         Lung cancer, smoker, hypothyroid, htn,    Lung cancer Father 70    Thyroid disease Father     Thyroid disease Brother     No Known Problems Maternal Grandmother     No Known Problems Maternal Grandfather     Breast cancer Paternal Grandmother 80    Colon cancer Paternal Grandmother 80    No Known Problems Paternal Grandfather     No Known Problems Son     Arthritis Family     Ovarian cancer Neg Hx     Uterine cancer Neg Hx     Cervical cancer Neg Hx     Pancreatic cancer Neg Hx       reports that she has never smoked. She has never been exposed to tobacco smoke. She has never used smokeless tobacco. She reports current alcohol use. She reports that she does not use drugs.  Current Outpatient Medications on File Prior to Visit   Medication Sig Dispense " "Refill    albuterol (Proventil HFA) 90 mcg/act inhaler Inhale 2 puffs every 4 (four) hours as needed for wheezing or shortness of breath 6.7 g 0    diphenhydrAMINE (BENADRYL) 25 mg tablet Take 25 mg by mouth daily at bedtime as needed for itching      fluticasone (FLONASE) 50 mcg/act nasal spray 2 sprays into each nostril daily 17 mL 5    fluticasone-salmeterol (AIRDUO RESPICLICK) 113-14 mcg/act dry powder inhaler inhale 1 puff twice a day RINSE MOUTH AND SPIT AFTER EACH USE 1 each 5    ketorolac (ACULAR) 0.4 % SOLN Administer 1 drop to both eyes 4 (four) times a day 5 mL 2    loratadine (CLARITIN) 10 mg tablet Take 10 mg by mouth daily      montelukast (SINGULAIR) 10 mg tablet take 1 tablet by mouth at bedtime 30 tablet 5    Sodium Fluoride 5000 Sensitive 1.1-5 % GEL BRUSH DAILY AS DIRECTED.      benzonatate (TESSALON) 200 MG capsule Take 1 capsule (200 mg total) by mouth 3 (three) times a day as needed for cough (Patient not taking: Reported on 5/20/2024) 20 capsule 0    triamcinolone (KENALOG) 0.1 % ointment Apply topically 2 (two) times a day (Patient not taking: Reported on 5/20/2024) 30 g 1     No current facility-administered medications on file prior to visit.     Allergies   Allergen Reactions    Penicillins Blisters     [facility] [id number]: blisters         Objective   /86 (Patient Position: Sitting, Cuff Size: Standard)   Pulse 74   Resp 16   Ht 5' 7\" (1.702 m)   Wt 80.7 kg (178 lb)   SpO2 98%   BMI 27.88 kg/m²     Body mass index is 27.88 kg/m².  Pain Screening:  Pain Score: 0-No pain    Physical Exam  Vitals reviewed. Exam conducted with a chaperone present.   Constitutional:       General: She is not in acute distress.     Appearance: Normal appearance. She is not ill-appearing.   HENT:      Head: Normocephalic and atraumatic.      Mouth/Throat:      Mouth: Mucous membranes are moist.   Eyes:      General:         Right eye: No discharge.         Left eye: No discharge.      " "Conjunctiva/sclera: Conjunctivae normal.   Pulmonary:      Effort: Pulmonary effort is normal.   Abdominal:      Palpations: Abdomen is soft. There is no mass.      Tenderness: There is no abdominal tenderness.      Hernia: No hernia is present.   Genitourinary:     Comments: The external female genitalia is normal. The bartholin's, uretheral and skenes glands are normal. The urethral meatus is normal (midline with no lesions). Anus without fissure or lesion. Speculum exam reveals a grossly normal vagina and cervix. No masses, lesions,discharge or bleeding. No significant cystocele or rectocele noted. Bimanual exam notes a small mobile uterus and left adnexal fullness with no palpable masses or fixation. Bladder is without fullness, mass or tenderness.  Musculoskeletal:      Right lower leg: No edema.      Left lower leg: No edema.   Skin:     General: Skin is warm and dry.      Coloration: Skin is not jaundiced.      Findings: No rash.   Neurological:      General: No focal deficit present.      Mental Status: She is alert and oriented to person, place, and time.      Cranial Nerves: No cranial nerve deficit.      Sensory: No sensory deficit.      Motor: No weakness.      Gait: Gait normal.   Psychiatric:         Mood and Affect: Mood normal.         Behavior: Behavior normal.         Thought Content: Thought content normal.         Judgment: Judgment normal.        Labs: I have reviewed pertinent labs.   No results found for: \"\"  Lab Results   Component Value Date/Time    Potassium 4.0 05/21/2024 09:38 AM    Chloride 103 05/21/2024 09:38 AM    CO2 31 05/21/2024 09:38 AM    BUN 21 05/21/2024 09:38 AM    Creatinine 0.75 05/21/2024 09:38 AM    Glucose, Fasting 95 05/21/2024 09:38 AM    Calcium 9.2 05/21/2024 09:38 AM    AST 15 05/21/2024 09:38 AM    ALT 12 05/21/2024 09:38 AM    Alkaline Phosphatase 65 05/21/2024 09:38 AM    eGFR 87 05/21/2024 09:38 AM     No results found for: \"WBC\", \"HGB\", \"HCT\", \"MCV\", " "\"PLT\"  No results found for: \"NEUTROABS\"     Trend:  No results found for: \"\"    Radiology Results Review: I personally reviewed the following image studies in PACS and associated radiology reports: Ultrasound(s). My interpretation of the radiology images/reports is: Complex left adnexal cyst. No ascites.  Other Study Results Review : No additional pertinent studies reviewed.    Administrative Statements   I have spent a total time of 40 minutes in caring for this patient on the day of the visit/encounter including Diagnostic results, Risks and benefits of tx options, Instructions for management, Impressions, Documenting in the medical record, and Reviewing / ordering tests, medicine, procedures  .     Lorena Michael MD, MPH  Division of Gynecologic Oncology  01/07/25 1:04 PM    "

## 2025-01-07 NOTE — H&P (VIEW-ONLY)
Name: Nataly Shea      : 1965      MRN: [id number]  Encounter Provider: Lorena Michael MD  Encounter Date: 2025   Encounter department: [facility]  :  Assessment & Plan  Adnexal mass  We discussed that this adnexal mass could represent a benign, borderline or malignant growth. Her imaging demonstrates a 3.3x2.2cm solid appearing left ovarian cyst and 2.7x1.8cm irregular septated solid left ovarian cyst.    I recommend surgical management with removal of the pelvic mass via a laparoscopic approach with laparoscopic BSO and frozen section. Reviewed that frozen section could reveal benign, borderline or malignant features. If benign, plan would be laparoscopic BSO only. If a borderline change is suspected on frozen, discussed the risk of upstaging to a melania malignancy, but that more likely the diagnosis would remain a borderline. Given the small risk of upstaging, we would perform a total laparoscopic hysterectomy, omental biopsy and biopsy of any peritoneal lesions. If an ovarian neoplasm is detected on frozen, we would recommend full staging which would include a TLH/BSO, pelvic washings, an omental biopsy, and a biopsy of any peritoneal lesions, and pelvic and para-aortic lymphadenectomy. We also discussed the possible need for mini-laparotomy for specimen removal if the ovarian mass cannot be removed via a laparoscopic approach.      Further workup will be done with tumor marker with Ca-125. If elevated, we will obtain a preoperative CT CAP.    All questions answered and surgical consents signed. Will obtain pre-operative labs and EKG. Can continue home medications per anesthesia recs.      Orders:    Ambulatory Referral to Gynecologic Oncology    Type and screen; Future    Comprehensive metabolic panel; Future    CBC and Platelet; Future    APTT; Future    Protime-INR; Future    HEMOGLOBIN A1C W/ EAG ESTIMATION; Future    EKG 12 lead; Future    ;  Future    Case request operating room: EXAM UNDER ANESTHESIA (EUA), SALPINGO-OOPHORECTOMY, LAPAROSCOPIC, HYSTERECTOMY LAPAROSCOPIC TOTAL (LTH) WITH SALPINGO-OOPHERECTOMY, DISSECTION/STAGING LYMPH NODE LAPAROSCOPIC PELVIS/ABDOMEN; Standing      History of Present Illness   Reason for Visit / CC:   Adnexal mass  Nataly Shea is a 59 y.o. female with PMH reactive airway disease (AirDuo, singulair, PRN albuterol), hx uveitis presenting with complex left adnexal cysts.    The patient reports experiencing pelvic pain starting in early December which she describes as feeling almost like a muscle/ligament pull near her pubic symphysis on the left side. 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 She presented to her PCP Dr. Watson who ordered a UA (negative), hip xray (negative), and pelvis US which demonstrated a 3.3x2.2cm solid appearing left ovarian cyst and 2.7x1.8cm irregular septated solid left ovarian cyst.     She reports that her prior presenting pain only lasted a few days and now has resolved. She denies abdominal/pelvic pain, early satiety/bloating, nausea/emesis, unanticipated weight loss, or changes in urinary/bowel habits and otherwise feels well. She went through menopause in her early 40s with no issues.    Screening:  - Cervix: remote hx abnormal, last pap NILM/HRHPV neg in 2021  - CRC: colonoscopy scheduled 1/2025  - Breast: 6/2024 mammogram BIRADS 2    Review of Systems   Constitutional:  Negative for chills and fever.   HENT:  Negative for ear pain and sore throat.    Eyes:  Negative for pain and visual disturbance.   Respiratory:  Negative for cough and shortness of breath.    Cardiovascular:  Negative for chest pain and palpitations.   Gastrointestinal:  Negative for abdominal pain and vomiting.   Genitourinary:  Negative for dysuria, hematuria, pelvic pain, urgency, vaginal bleeding and vaginal discharge.   Musculoskeletal:   "Negative for arthralgias and back pain.   Skin:  Negative for color change and rash.   Neurological:  Negative for seizures and syncope.   All other systems reviewed and are negative.   A complete review of systems is negative other than that noted above in the HPI.  Past Medical History   Past Medical History:   Diagnosis Date    Abnormal Pap smear of cervix     Allergic     \"Since i was a child\", most recent normal    Anxiety     07oct2013  last assessed    DVT (deep venous thrombosis) ([facility])     12aug2014  last assessed    Fracture     closed fracture of the left distal tibia    GERD (gastroesophageal reflux disease)     With certain fruits, garlic or [employer].    Headache(784.0)     Rare headaches.  Occasional migraine.    Menstrual migraine     Pneumonia     Reactive airway disease     05jan2018 resolved    Seasonal allergies     Visual impairment Friday    Possibly allergic conjunctivitis?     Past Surgical History:   Procedure Laterality Date    CHOLECYSTECTOMY      COLONOSCOPY      EYE SURGERY      TONSILLECTOMY AND ADENOIDECTOMY       Family History   Problem Relation Age of Onset    Hypertension Mother     Diabetes Father     Hypertension Father         Lung cancer, smoker, hypothyroid, htn,    Lung cancer Father 70    Thyroid disease Father     Thyroid disease Brother     No Known Problems Maternal Grandmother     No Known Problems Maternal Grandfather     Breast cancer Paternal Grandmother 80    Colon cancer Paternal Grandmother 80    No Known Problems Paternal Grandfather     No Known Problems Son     Arthritis Family     Ovarian cancer Neg Hx     Uterine cancer Neg Hx     Cervical cancer Neg Hx     Pancreatic cancer Neg Hx       reports that she has never smoked. She has never been exposed to tobacco smoke. She has never used smokeless tobacco. She reports current alcohol use. She reports that she does not use drugs.  Current Outpatient Medications on File Prior to Visit   Medication Sig Dispense " "Refill    albuterol (Proventil HFA) 90 mcg/act inhaler Inhale 2 puffs every 4 (four) hours as needed for wheezing or shortness of breath 6.7 g 0    diphenhydrAMINE (BENADRYL) 25 mg tablet Take 25 mg by mouth daily at bedtime as needed for itching      fluticasone (FLONASE) 50 mcg/act nasal spray 2 sprays into each nostril daily 17 mL 5    fluticasone-salmeterol (AIRDUO RESPICLICK) 113-14 mcg/act dry powder inhaler inhale 1 puff twice a day RINSE MOUTH AND SPIT AFTER EACH USE 1 each 5    ketorolac (ACULAR) 0.4 % SOLN Administer 1 drop to both eyes 4 (four) times a day 5 mL 2    loratadine (CLARITIN) 10 mg tablet Take 10 mg by mouth daily      montelukast (SINGULAIR) 10 mg tablet take 1 tablet by mouth at bedtime 30 tablet 5    Sodium Fluoride 5000 Sensitive 1.1-5 % GEL BRUSH DAILY AS DIRECTED.      benzonatate (TESSALON) 200 MG capsule Take 1 capsule (200 mg total) by mouth 3 (three) times a day as needed for cough (Patient not taking: Reported on 5/20/2024) 20 capsule 0    triamcinolone (KENALOG) 0.1 % ointment Apply topically 2 (two) times a day (Patient not taking: Reported on 5/20/2024) 30 g 1     No current facility-administered medications on file prior to visit.     Allergies   Allergen Reactions    Penicillins Blisters     [facility] [id number]: blisters         Objective   /86 (Patient Position: Sitting, Cuff Size: Standard)   Pulse 74   Resp 16   Ht 5' 7\" (1.702 m)   Wt 80.7 kg (178 lb)   SpO2 98%   BMI 27.88 kg/m²     Body mass index is 27.88 kg/m².  Pain Screening:  Pain Score: 0-No pain    Physical Exam  Vitals reviewed. Exam conducted with a chaperone present.   Constitutional:       General: She is not in acute distress.     Appearance: Normal appearance. She is not ill-appearing.   HENT:      Head: Normocephalic and atraumatic.      Mouth/Throat:      Mouth: Mucous membranes are moist.   Eyes:      General:         Right eye: No discharge.         Left eye: No discharge.      " "Conjunctiva/sclera: Conjunctivae normal.   Pulmonary:      Effort: Pulmonary effort is normal.   Abdominal:      Palpations: Abdomen is soft. There is no mass.      Tenderness: There is no abdominal tenderness.      Hernia: No hernia is present.   Genitourinary:     Comments: The external female genitalia is normal. The bartholin's, uretheral and skenes glands are normal. The urethral meatus is normal (midline with no lesions). Anus without fissure or lesion. Speculum exam reveals a grossly normal vagina and cervix. No masses, lesions,discharge or bleeding. No significant cystocele or rectocele noted. Bimanual exam notes a small mobile uterus and left adnexal fullness with no palpable masses or fixation. Bladder is without fullness, mass or tenderness.  Musculoskeletal:      Right lower leg: No edema.      Left lower leg: No edema.   Skin:     General: Skin is warm and dry.      Coloration: Skin is not jaundiced.      Findings: No rash.   Neurological:      General: No focal deficit present.      Mental Status: She is alert and oriented to person, place, and time.      Cranial Nerves: No cranial nerve deficit.      Sensory: No sensory deficit.      Motor: No weakness.      Gait: Gait normal.   Psychiatric:         Mood and Affect: Mood normal.         Behavior: Behavior normal.         Thought Content: Thought content normal.         Judgment: Judgment normal.        Labs: I have reviewed pertinent labs.   No results found for: \"\"  Lab Results   Component Value Date/Time    Potassium 4.0 05/21/2024 09:38 AM    Chloride 103 05/21/2024 09:38 AM    CO2 31 05/21/2024 09:38 AM    BUN 21 05/21/2024 09:38 AM    Creatinine 0.75 05/21/2024 09:38 AM    Glucose, Fasting 95 05/21/2024 09:38 AM    Calcium 9.2 05/21/2024 09:38 AM    AST 15 05/21/2024 09:38 AM    ALT 12 05/21/2024 09:38 AM    Alkaline Phosphatase 65 05/21/2024 09:38 AM    eGFR 87 05/21/2024 09:38 AM     No results found for: \"WBC\", \"HGB\", \"HCT\", \"MCV\", " "\"PLT\"  No results found for: \"NEUTROABS\"     Trend:  No results found for: \"\"    Radiology Results Review: I personally reviewed the following image studies in PACS and associated radiology reports: Ultrasound(s). My interpretation of the radiology images/reports is: Complex left adnexal cyst. No ascites.  Other Study Results Review : No additional pertinent studies reviewed.    Administrative Statements   I have spent a total time of 40 minutes in caring for this patient on the day of the visit/encounter including Diagnostic results, Risks and benefits of tx options, Instructions for management, Impressions, Documenting in the medical record, and Reviewing / ordering tests, medicine, procedures  .     Lorena Michael MD, MPH  Division of Gynecologic Oncology  01/07/25 1:04 PM    "

## 2025-01-07 NOTE — ASSESSMENT & PLAN NOTE
We discussed that this adnexal mass could represent a benign, borderline or malignant growth. Her imaging demonstrates a 3.3x2.2cm solid appearing left ovarian cyst and 2.7x1.8cm irregular septated solid left ovarian cyst.    I recommend surgical management with removal of the pelvic mass via a laparoscopic approach with laparoscopic BSO and frozen section. Reviewed that frozen section could reveal benign, borderline or malignant features. If benign, plan would be laparoscopic BSO only. If a borderline change is suspected on frozen, discussed the risk of upstaging to a melania malignancy, but that more likely the diagnosis would remain a borderline. Given the small risk of upstaging, we would perform a total laparoscopic hysterectomy, omental biopsy and biopsy of any peritoneal lesions. If an ovarian neoplasm is detected on frozen, we would recommend full staging which would include a TLH/BSO, pelvic washings, an omental biopsy, and a biopsy of any peritoneal lesions, and pelvic and para-aortic lymphadenectomy. We also discussed the possible need for mini-laparotomy for specimen removal if the ovarian mass cannot be removed via a laparoscopic approach.      Further workup will be done with tumor marker with Ca-125. If elevated, we will obtain a preoperative CT CAP.    All questions answered and surgical consents signed. Will obtain pre-operative labs and EKG. Can continue home medications per anesthesia recs.      Orders:    Ambulatory Referral to Gynecologic Oncology    Type and screen; Future    Comprehensive metabolic panel; Future    CBC and Platelet; Future    APTT; Future    Protime-INR; Future    HEMOGLOBIN A1C W/ EAG ESTIMATION; Future    EKG 12 lead; Future    ; Future    Case request operating room: EXAM UNDER ANESTHESIA (EUA), SALPINGO-OOPHORECTOMY, LAPAROSCOPIC, HYSTERECTOMY LAPAROSCOPIC TOTAL (LTH) WITH SALPINGO-OOPHERECTOMY, DISSECTION/STAGING LYMPH NODE LAPAROSCOPIC PELVIS/ABDOMEN; Standing

## 2025-01-10 ENCOUNTER — ANESTHESIA EVENT (OUTPATIENT)
Dept: ANESTHESIOLOGY | Facility: HOSPITAL | Age: 60
End: 2025-01-10

## 2025-01-10 ENCOUNTER — ANESTHESIA (OUTPATIENT)
Dept: ANESTHESIOLOGY | Facility: HOSPITAL | Age: 60
End: 2025-01-10

## 2025-01-10 LAB
ATRIAL RATE: 52 BPM
P AXIS: 64 DEGREES
PR INTERVAL: 190 MS
QRS AXIS: 52 DEGREES
QRSD INTERVAL: 80 MS
QT INTERVAL: 454 MS
QTC INTERVAL: 423 MS
T WAVE AXIS: 26 DEGREES
VENTRICULAR RATE: 52 BPM

## 2025-01-10 PROCEDURE — 93010 ELECTROCARDIOGRAM REPORT: CPT | Performed by: INTERNAL MEDICINE

## 2025-01-17 RX ORDER — VITAMIN B COMPLEX
1 CAPSULE ORAL DAILY
COMMUNITY

## 2025-01-17 NOTE — PRE-PROCEDURE INSTRUCTIONS
Pre-Surgery Instructions:   Medication Instructions    albuterol (Proventil HFA) 90 mcg/act inhaler Take Day of Surgery; unless usually taken at night     b complex vitamins capsule Avoid 1 week prior to surgery      diphenhydrAMINE (BENADRYL) 25 mg tablet Do not take day of surgery; continue as prescribed excluding DOS -PRN    fluticasone (FLONASE) 50 mcg/act nasal spray Take Day of Surgery; unless usually taken at night -PRN    fluticasone-salmeterol (AIRDUO RESPICLICK) 113-14 mcg/act dry powder inhaler Take Day of Surgery; unless usually taken at night     ketorolac (ACULAR) 0.4 % SOLN Take Day of Surgery; unless usually taken at night     loratadine (CLARITIN) 10 mg tablet Take Day of Surgery; unless usually taken at night     montelukast (SINGULAIR) 10 mg tablet Take Day of Surgery; unless usually taken at night - usually taken at night    Sodium Fluoride 5000 Sensitive 1.1-5 % GEL Continue as prescribed    Medication instructions for day surgery reviewed. Please use only a sip of water to take your instructed medications. Avoid all over the counter vitamins, supplements and NSAIDS for one week prior to surgery per anesthesia guidelines. Tylenol is ok to take as needed.     You will receive a call one business day prior to surgery with an arrival time and hospital directions. If your surgery is scheduled on a Monday, the hospital will be calling you on the Friday prior to your surgery. If you have not heard from anyone by 8pm, please call the hospital supervisor through the hospital  at [phone]. ([place] [phone] or [place] [phone]).    Do not eat or drink anything after midnight the night before your surgery, including candy, mints, lifesavers, or chewing gum. Do not drink alcohol 24hrs before your surgery. Try not to smoke at least 24hrs before your surgery.       Follow the pre surgery showering instructions as listed in the “My Surgical Experience Booklet” or otherwise provided by  your surgeon's office. Do not use a blade to shave the surgical area 1 week before surgery. It is okay to use a clean electric clippers up to 24 hours before surgery. Do not apply any lotions, creams, including makeup, cologne, deodorant, or perfumes after showering on the day of your surgery. Do not use dry shampoo, hair spray, hair gel, or any type of hair products.     No contact lenses, eye make-up, or artificial eyelashes. Remove nail polish, including gel polish, and any artificial, gel, or acrylic nails if possible. Remove all jewelry including rings and body piercing jewelry.     Wear causal clothing that is easy to take on and off. Consider your type of surgery.    Keep any valuables, jewelry, piercings at home. Please bring any specially ordered equipment (sling, braces) if indicated.    Arrange for a responsible person to drive you to and from the hospital on the day of your surgery. Please confirm the visitor policy for the day of your procedure when you receive your phone call with an arrival time.     Call the surgeon's office with any new illnesses, exposures, or additional questions prior to surgery.    Please reference your “My Surgical Experience Booklet” for additional information to prepare for your upcoming surgery.

## 2025-01-23 ENCOUNTER — ANESTHESIA (OUTPATIENT)
Dept: ANESTHESIOLOGY | Facility: HOSPITAL | Age: 60
End: 2025-01-23

## 2025-01-23 ENCOUNTER — ANESTHESIA EVENT (OUTPATIENT)
Dept: ANESTHESIOLOGY | Facility: HOSPITAL | Age: 60
End: 2025-01-23

## 2025-01-23 RX ORDER — SODIUM CHLORIDE, SODIUM LACTATE, POTASSIUM CHLORIDE, CALCIUM CHLORIDE 600; 310; 30; 20 MG/100ML; MG/100ML; MG/100ML; MG/100ML
125 INJECTION, SOLUTION INTRAVENOUS CONTINUOUS
Status: CANCELLED | OUTPATIENT
Start: 2025-01-23

## 2025-01-24 ENCOUNTER — ANESTHESIA (OUTPATIENT)
Dept: GASTROENTEROLOGY | Facility: MEDICAL CENTER | Age: 60
End: 2025-01-24
Payer: COMMERCIAL

## 2025-01-24 ENCOUNTER — ANESTHESIA EVENT (OUTPATIENT)
Dept: GASTROENTEROLOGY | Facility: MEDICAL CENTER | Age: 60
End: 2025-01-24
Payer: COMMERCIAL

## 2025-01-24 ENCOUNTER — HOSPITAL ENCOUNTER (OUTPATIENT)
Dept: GASTROENTEROLOGY | Facility: MEDICAL CENTER | Age: 60
Setting detail: OUTPATIENT SURGERY
End: 2025-01-24
Payer: COMMERCIAL

## 2025-01-24 VITALS
WEIGHT: 177.91 LBS | HEIGHT: 67 IN | OXYGEN SATURATION: 100 % | TEMPERATURE: 98.7 F | SYSTOLIC BLOOD PRESSURE: 99 MMHG | HEART RATE: 59 BPM | BODY MASS INDEX: 27.92 KG/M2 | RESPIRATION RATE: 18 BRPM | DIASTOLIC BLOOD PRESSURE: 55 MMHG

## 2025-01-24 DIAGNOSIS — Z86.0100 HISTORY OF COLON POLYPS: ICD-10-CM

## 2025-01-24 PROCEDURE — G0105 COLORECTAL SCRN; HI RISK IND: HCPCS | Performed by: INTERNAL MEDICINE

## 2025-01-24 RX ORDER — PROPOFOL 10 MG/ML
INJECTION, EMULSION INTRAVENOUS AS NEEDED
Status: DISCONTINUED | OUTPATIENT
Start: 2025-01-24 | End: 2025-01-24

## 2025-01-24 RX ORDER — SODIUM CHLORIDE, SODIUM LACTATE, POTASSIUM CHLORIDE, CALCIUM CHLORIDE 600; 310; 30; 20 MG/100ML; MG/100ML; MG/100ML; MG/100ML
125 INJECTION, SOLUTION INTRAVENOUS CONTINUOUS
Status: SHIPPED | OUTPATIENT
Start: 2025-01-24

## 2025-01-24 RX ADMIN — SODIUM CHLORIDE, SODIUM LACTATE, POTASSIUM CHLORIDE, AND CALCIUM CHLORIDE 125 ML/HR: .6; .31; .03; .02 INJECTION, SOLUTION INTRAVENOUS at 07:36

## 2025-01-24 RX ADMIN — PROPOFOL 30 MG: 10 INJECTION, EMULSION INTRAVENOUS at 07:52

## 2025-01-24 RX ADMIN — PROPOFOL 30 MG: 10 INJECTION, EMULSION INTRAVENOUS at 07:49

## 2025-01-24 RX ADMIN — Medication 40 MG: at 07:56

## 2025-01-24 RX ADMIN — PROPOFOL 30 MG: 10 INJECTION, EMULSION INTRAVENOUS at 07:56

## 2025-01-24 RX ADMIN — PROPOFOL 20 MG: 10 INJECTION, EMULSION INTRAVENOUS at 07:46

## 2025-01-24 RX ADMIN — PROPOFOL 30 MG: 10 INJECTION, EMULSION INTRAVENOUS at 07:54

## 2025-01-24 RX ADMIN — PROPOFOL 30 MG: 10 INJECTION, EMULSION INTRAVENOUS at 07:50

## 2025-01-24 RX ADMIN — PROPOFOL 100 MG: 10 INJECTION, EMULSION INTRAVENOUS at 07:45

## 2025-01-24 RX ADMIN — PROPOFOL 20 MG: 10 INJECTION, EMULSION INTRAVENOUS at 07:47

## 2025-01-24 NOTE — H&P
"H&P - Gastroenterology   Name: Nataly Shea 59 y.o. female I MRN: [id number]  Unit/Bed#:  I Date of Admission: 1/24/2025   Date of Service: 1/24/2025 I Hospital Day: 0     Assessment & Plan   This is a 59 y.o. year old female here for colonoscopy, and she is stable and optimized for her procedure.    History of Present Illness    Nataly Shea is a 59 y.o. year old female who presents for history of colon polyps    REVIEW OF SYSTEMS: Per the HPI, and otherwise unremarkable.    Historical Information   Past Medical History:   Diagnosis Date    Abnormal Pap smear of cervix     Allergic     \"Since i was a child\", most recent normal    Anxiety     07oct2013  last assessed    DVT (deep venous thrombosis) ([facility])     12aug2014  last assessed    Fracture     closed fracture of the left distal tibia    GERD (gastroesophageal reflux disease)     With certain fruits, garlic or [employer].    Headache(784.0)     Rare headaches.  Occasional migraine.    Menstrual migraine     Pneumonia     PONV (postoperative nausea and vomiting)     Reactive airway disease     05jan2018 resolved    Seasonal allergies     Visual impairment Friday    Possibly allergic conjunctivitis?     Past Surgical History:   Procedure Laterality Date    CHOLECYSTECTOMY      COLONOSCOPY      EYE SURGERY Left     lazy eye correction    TONSILLECTOMY AND ADENOIDECTOMY       Social History     Tobacco Use    Smoking status: Never     Passive exposure: Never    Smokeless tobacco: Never   Vaping Use    Vaping status: Never Used   Substance and Sexual Activity    Alcohol use: Yes     Comment: Occasional    Drug use: No    Sexual activity: Not Currently     Birth control/protection: Abstinence     E-Cigarette/Vaping    E-Cigarette Use Never User      E-Cigarette/Vaping Substances    Nicotine No     THC No     CBD No     Flavoring No     Other No     Unknown No      Family history non-contributory    Meds/Allergies     Current Outpatient Medications:     albuterol " (Proventil HFA) 90 mcg/act inhaler    b complex vitamins capsule    diphenhydrAMINE (BENADRYL) 25 mg tablet    fluticasone (FLONASE) 50 mcg/act nasal spray    fluticasone-salmeterol (AIRDUO RESPICLICK) 113-14 mcg/act dry powder inhaler    ketorolac (ACULAR) 0.4 % SOLN    loratadine (CLARITIN) 10 mg tablet    montelukast (SINGULAIR) 10 mg tablet    Sodium Fluoride 5000 Sensitive 1.1-5 % GEL  Allergies   Allergen Reactions    Penicillins Blisters     Annotation - [id number]: blisters       Objective :  Temp:  [98.7 °F (37.1 °C)] 98.7 °F (37.1 °C)  HR:  [72] 72  BP: (134)/(70) 134/70  Resp:  [18] 18  SpO2:  [99 %] 99 %  O2 Device: None (Room air)    Physical Exam  Gen: NAD  Head: NCAT  CV: RRR  CHEST: Clear  ABD: soft, NT/ND  EXT: no edema

## 2025-01-24 NOTE — ANESTHESIA POSTPROCEDURE EVALUATION
Post-Op Assessment Note    CV Status:  Stable  Pain Score: 0    Pain management: adequate       Mental Status:  Alert and awake   Hydration Status:  Euvolemic   PONV Controlled:  Controlled   Airway Patency:  Patent     Post Op Vitals Reviewed: Yes    No anethesia notable event occurred.    Staff: Anesthesiologist, CRNA           Last Filed PACU Vitals:  Vitals Value Taken Time   Temp     Pulse 60 01/24/25 0807   /54 01/24/25 0807   Resp 20 01/24/25 0807   SpO2 100 % 01/24/25 0807

## 2025-01-24 NOTE — ANESTHESIA PREPROCEDURE EVALUATION
Procedure:  COLONOSCOPY    Relevant Problems   ANESTHESIA (within normal limits)      CARDIO   (+) Migraine headache   (+) Pure hypercholesterolemia      ENDO (within normal limits)      GI/HEPATIC (within normal limits)      /RENAL (within normal limits)      GYN (within normal limits)      HEMATOLOGY (within normal limits)      MUSCULOSKELETAL (within normal limits)      NEURO/PSYCH   (+) Migraine headache      PULMONARY (within normal limits)      Respiratory/Allergy   (+) Reactive airway disease      Lab Results   Component Value Date    WBC 6.37 01/07/2025    HGB 13.4 01/07/2025    HCT 40.9 01/07/2025    MCV 93 01/07/2025     01/07/2025     Lab Results   Component Value Date     10/08/2013    SODIUM 140 01/07/2025    K 4.1 01/07/2025     01/07/2025    CO2 29 01/07/2025    ANIONGAP 7 10/08/2013    AGAP 6 01/07/2025    BUN 18 01/07/2025    CREATININE 0.73 01/07/2025    GLUC 97 01/07/2025    GLUF 95 05/21/2024    CALCIUM 9.3 01/07/2025    AST 15 01/07/2025    ALT 12 01/07/2025    ALKPHOS 81 01/07/2025    PROT 7.7 10/08/2013    TP 7.6 01/07/2025    BILITOT 0.39 10/08/2013    TBILI 0.39 01/07/2025    EGFR 89 01/07/2025     Lab Results   Component Value Date    PTT 23 01/07/2025     Lab Results   Component Value Date    INR 0.90 01/07/2025    PROTIME 12.8 01/07/2025       Physical Exam    Airway    Mallampati score: II  TM Distance: >3 FB  Neck ROM: full     Dental       Cardiovascular  Cardiovascular exam normal    Pulmonary  Pulmonary exam normal     Other Findings  post-pubertal.      Anesthesia Plan  ASA Score- 2     Anesthesia Type- IV sedation with anesthesia with ASA Monitors.         Additional Monitors:     Airway Plan:            Plan Factors-Exercise tolerance (METS): >4 METS.    Chart reviewed. EKG reviewed.  Existing labs reviewed. Patient summary reviewed.          Obstructive sleep apnea risk education given perioperatively.        Induction- intravenous.    Postoperative Plan-          Informed Consent- Anesthetic plan and risks discussed with patient.  I personally reviewed this patient with the CRNA. Discussed and agreed on the Anesthesia Plan with the CRNA..      NPO Status:  Vitals Value Taken Time   Date of last liquid 01/24/25 01/24/25 0732   Time of last liquid 0230 01/24/25 0732   Date of last solid 01/22/25 01/24/25 0732   Time of last solid 1800 01/24/25 0732

## 2025-01-27 ENCOUNTER — ANESTHESIA EVENT (OUTPATIENT)
Dept: PERIOP | Facility: HOSPITAL | Age: 60
End: 2025-01-27
Payer: COMMERCIAL

## 2025-01-27 RX ORDER — SODIUM CHLORIDE, SODIUM LACTATE, POTASSIUM CHLORIDE, CALCIUM CHLORIDE 600; 310; 30; 20 MG/100ML; MG/100ML; MG/100ML; MG/100ML
125 INJECTION, SOLUTION INTRAVENOUS CONTINUOUS
Status: CANCELLED | OUTPATIENT
Start: 2025-01-27

## 2025-01-28 ENCOUNTER — HOSPITAL ENCOUNTER (OUTPATIENT)
Facility: HOSPITAL | Age: 60
Setting detail: OUTPATIENT SURGERY
Discharge: HOME | End: 2025-01-28
Attending: OBSTETRICS & GYNECOLOGY | Admitting: OBSTETRICS & GYNECOLOGY
Payer: COMMERCIAL

## 2025-01-28 ENCOUNTER — ANESTHESIA (OUTPATIENT)
Dept: PERIOP | Facility: HOSPITAL | Age: 60
End: 2025-01-28
Payer: COMMERCIAL

## 2025-01-28 VITALS
BODY MASS INDEX: 27.94 KG/M2 | SYSTOLIC BLOOD PRESSURE: 132 MMHG | DIASTOLIC BLOOD PRESSURE: 69 MMHG | WEIGHT: 178 LBS | OXYGEN SATURATION: 99 % | HEIGHT: 67 IN | HEART RATE: 74 BPM | RESPIRATION RATE: 16 BRPM | TEMPERATURE: 96.9 F

## 2025-01-28 DIAGNOSIS — N94.89 ADNEXAL MASS: Primary | ICD-10-CM

## 2025-01-28 PROBLEM — K21.9 GASTROESOPHAGEAL REFLUX DISEASE: Status: ACTIVE | Noted: 2025-01-28

## 2025-01-28 LAB
ABO GROUP BLD: NORMAL
RH BLD: POSITIVE

## 2025-01-28 PROCEDURE — 88112 CYTOPATH CELL ENHANCE TECH: CPT | Performed by: PATHOLOGY

## 2025-01-28 PROCEDURE — 88331 PATH CONSLTJ SURG 1 BLK 1SPC: CPT | Performed by: PATHOLOGY

## 2025-01-28 PROCEDURE — 88307 TISSUE EXAM BY PATHOLOGIST: CPT | Performed by: PATHOLOGY

## 2025-01-28 PROCEDURE — 88342 IMHCHEM/IMCYTCHM 1ST ANTB: CPT | Performed by: PATHOLOGY

## 2025-01-28 PROCEDURE — 88341 IMHCHEM/IMCYTCHM EA ADD ANTB: CPT | Performed by: PATHOLOGY

## 2025-01-28 PROCEDURE — 88313 SPECIAL STAINS GROUP 2: CPT | Performed by: PATHOLOGY

## 2025-01-28 PROCEDURE — 58661 LAPAROSCOPY REMOVE ADNEXA: CPT | Performed by: OBSTETRICS & GYNECOLOGY

## 2025-01-28 RX ORDER — METOCLOPRAMIDE HYDROCHLORIDE 5 MG/ML
10 INJECTION INTRAMUSCULAR; INTRAVENOUS ONCE AS NEEDED
Status: DISCONTINUED | OUTPATIENT
Start: 2025-01-28 | End: 2025-01-28 | Stop reason: HOSPADM

## 2025-01-28 RX ORDER — MIDAZOLAM HYDROCHLORIDE 2 MG/2ML
INJECTION, SOLUTION INTRAMUSCULAR; INTRAVENOUS AS NEEDED
Status: DISCONTINUED | OUTPATIENT
Start: 2025-01-28 | End: 2025-01-28

## 2025-01-28 RX ORDER — ROCURONIUM BROMIDE 10 MG/ML
INJECTION, SOLUTION INTRAVENOUS AS NEEDED
Status: DISCONTINUED | OUTPATIENT
Start: 2025-01-28 | End: 2025-01-28

## 2025-01-28 RX ORDER — GABAPENTIN 100 MG/1
100 CAPSULE ORAL ONCE
Status: COMPLETED | OUTPATIENT
Start: 2025-01-28 | End: 2025-01-28

## 2025-01-28 RX ORDER — ALBUTEROL SULFATE 0.83 MG/ML
2.5 SOLUTION RESPIRATORY (INHALATION) ONCE AS NEEDED
Status: DISCONTINUED | OUTPATIENT
Start: 2025-01-28 | End: 2025-01-28 | Stop reason: HOSPADM

## 2025-01-28 RX ORDER — FENTANYL CITRATE 50 UG/ML
INJECTION, SOLUTION INTRAMUSCULAR; INTRAVENOUS AS NEEDED
Status: DISCONTINUED | OUTPATIENT
Start: 2025-01-28 | End: 2025-01-28

## 2025-01-28 RX ORDER — LIDOCAINE HYDROCHLORIDE 10 MG/ML
0.5 INJECTION, SOLUTION EPIDURAL; INFILTRATION; INTRACAUDAL; PERINEURAL ONCE AS NEEDED
Status: DISCONTINUED | OUTPATIENT
Start: 2025-01-28 | End: 2025-01-28 | Stop reason: HOSPADM

## 2025-01-28 RX ORDER — IBUPROFEN 600 MG/1
600 TABLET, FILM COATED ORAL EVERY 6 HOURS PRN
Status: DISCONTINUED | OUTPATIENT
Start: 2025-01-28 | End: 2025-01-28 | Stop reason: HOSPADM

## 2025-01-28 RX ORDER — DEXAMETHASONE SODIUM PHOSPHATE 10 MG/ML
INJECTION, SOLUTION INTRAMUSCULAR; INTRAVENOUS AS NEEDED
Status: DISCONTINUED | OUTPATIENT
Start: 2025-01-28 | End: 2025-01-28

## 2025-01-28 RX ORDER — PROMETHAZINE HYDROCHLORIDE 25 MG/ML
12.5 INJECTION, SOLUTION INTRAMUSCULAR; INTRAVENOUS ONCE AS NEEDED
Status: DISCONTINUED | OUTPATIENT
Start: 2025-01-28 | End: 2025-01-28 | Stop reason: HOSPADM

## 2025-01-28 RX ORDER — OXYCODONE HYDROCHLORIDE 5 MG/1
5 TABLET ORAL EVERY 4 HOURS PRN
Status: DISCONTINUED | OUTPATIENT
Start: 2025-01-28 | End: 2025-01-28 | Stop reason: HOSPADM

## 2025-01-28 RX ORDER — ONDANSETRON 2 MG/ML
INJECTION INTRAMUSCULAR; INTRAVENOUS AS NEEDED
Status: DISCONTINUED | OUTPATIENT
Start: 2025-01-28 | End: 2025-01-28

## 2025-01-28 RX ORDER — ACETAMINOPHEN 325 MG/1
975 TABLET ORAL ONCE
Status: COMPLETED | OUTPATIENT
Start: 2025-01-28 | End: 2025-01-28

## 2025-01-28 RX ORDER — LABETALOL HYDROCHLORIDE 5 MG/ML
10 INJECTION, SOLUTION INTRAVENOUS
Status: DISCONTINUED | OUTPATIENT
Start: 2025-01-28 | End: 2025-01-28 | Stop reason: HOSPADM

## 2025-01-28 RX ORDER — HYDROMORPHONE HCL/PF 1 MG/ML
0.5 SYRINGE (ML) INJECTION
Status: DISCONTINUED | OUTPATIENT
Start: 2025-01-28 | End: 2025-01-28 | Stop reason: HOSPADM

## 2025-01-28 RX ORDER — HYDROMORPHONE HCL IN WATER/PF 6 MG/30 ML
0.2 PATIENT CONTROLLED ANALGESIA SYRINGE INTRAVENOUS
Status: DISCONTINUED | OUTPATIENT
Start: 2025-01-28 | End: 2025-01-28 | Stop reason: HOSPADM

## 2025-01-28 RX ORDER — LIDOCAINE HYDROCHLORIDE 10 MG/ML
INJECTION, SOLUTION EPIDURAL; INFILTRATION; INTRACAUDAL; PERINEURAL AS NEEDED
Status: DISCONTINUED | OUTPATIENT
Start: 2025-01-28 | End: 2025-01-28

## 2025-01-28 RX ORDER — HYDROMORPHONE HCL/PF 1 MG/ML
SYRINGE (ML) INJECTION AS NEEDED
Status: DISCONTINUED | OUTPATIENT
Start: 2025-01-28 | End: 2025-01-28

## 2025-01-28 RX ORDER — BUPIVACAINE HYDROCHLORIDE 2.5 MG/ML
INJECTION, SOLUTION EPIDURAL; INFILTRATION; INTRACAUDAL AS NEEDED
Status: DISCONTINUED | OUTPATIENT
Start: 2025-01-28 | End: 2025-01-28 | Stop reason: HOSPADM

## 2025-01-28 RX ORDER — PROMETHAZINE HYDROCHLORIDE 25 MG/ML
6.25 INJECTION, SOLUTION INTRAMUSCULAR; INTRAVENOUS ONCE
Status: COMPLETED | OUTPATIENT
Start: 2025-01-28 | End: 2025-01-28

## 2025-01-28 RX ORDER — HYDRALAZINE HYDROCHLORIDE 20 MG/ML
5 INJECTION INTRAMUSCULAR; INTRAVENOUS
Status: DISCONTINUED | OUTPATIENT
Start: 2025-01-28 | End: 2025-01-28 | Stop reason: HOSPADM

## 2025-01-28 RX ORDER — PROPOFOL 10 MG/ML
INJECTION, EMULSION INTRAVENOUS AS NEEDED
Status: DISCONTINUED | OUTPATIENT
Start: 2025-01-28 | End: 2025-01-28

## 2025-01-28 RX ORDER — EPHEDRINE SULFATE 50 MG/ML
INJECTION INTRAVENOUS AS NEEDED
Status: DISCONTINUED | OUTPATIENT
Start: 2025-01-28 | End: 2025-01-28

## 2025-01-28 RX ORDER — ACETAMINOPHEN 325 MG/1
975 TABLET ORAL EVERY 6 HOURS PRN
Status: DISCONTINUED | OUTPATIENT
Start: 2025-01-28 | End: 2025-01-28 | Stop reason: HOSPADM

## 2025-01-28 RX ORDER — SCOPOLAMINE 1 MG/3D
1 PATCH, EXTENDED RELEASE TRANSDERMAL ONCE
Status: DISCONTINUED | OUTPATIENT
Start: 2025-01-28 | End: 2025-01-28 | Stop reason: HOSPADM

## 2025-01-28 RX ORDER — FENTANYL CITRATE/PF 50 MCG/ML
25 SYRINGE (ML) INJECTION
Status: DISCONTINUED | OUTPATIENT
Start: 2025-01-28 | End: 2025-01-28 | Stop reason: HOSPADM

## 2025-01-28 RX ORDER — SODIUM CHLORIDE, SODIUM LACTATE, POTASSIUM CHLORIDE, CALCIUM CHLORIDE 600; 310; 30; 20 MG/100ML; MG/100ML; MG/100ML; MG/100ML
125 INJECTION, SOLUTION INTRAVENOUS CONTINUOUS
Status: DISCONTINUED | OUTPATIENT
Start: 2025-01-28 | End: 2025-01-28 | Stop reason: HOSPADM

## 2025-01-28 RX ORDER — ONDANSETRON 2 MG/ML
4 INJECTION INTRAMUSCULAR; INTRAVENOUS ONCE AS NEEDED
Status: DISCONTINUED | OUTPATIENT
Start: 2025-01-28 | End: 2025-01-28 | Stop reason: HOSPADM

## 2025-01-28 RX ORDER — MAGNESIUM HYDROXIDE 1200 MG/15ML
LIQUID ORAL AS NEEDED
Status: DISCONTINUED | OUTPATIENT
Start: 2025-01-28 | End: 2025-01-28 | Stop reason: HOSPADM

## 2025-01-28 RX ORDER — OXYCODONE HYDROCHLORIDE 5 MG/1
5 TABLET ORAL EVERY 4 HOURS PRN
Qty: 5 TABLET | Refills: 0 | Status: SHIPPED | OUTPATIENT
Start: 2025-01-28 | End: 2025-02-07

## 2025-01-28 RX ADMIN — ONDANSETRON 4 MG: 2 INJECTION INTRAMUSCULAR; INTRAVENOUS at 13:47

## 2025-01-28 RX ADMIN — PROPOFOL 120 MG: 10 INJECTION, EMULSION INTRAVENOUS at 11:38

## 2025-01-28 RX ADMIN — PROMETHAZINE HYDROCHLORIDE 6.25 MG: 25 INJECTION INTRAMUSCULAR; INTRAVENOUS at 15:39

## 2025-01-28 RX ADMIN — EPHEDRINE SULFATE 10 MG: 50 INJECTION, SOLUTION INTRAVENOUS at 12:19

## 2025-01-28 RX ADMIN — GABAPENTIN 100 MG: 100 CAPSULE ORAL at 10:17

## 2025-01-28 RX ADMIN — ACETAMINOPHEN 975 MG: 325 TABLET, FILM COATED ORAL at 10:17

## 2025-01-28 RX ADMIN — OXYCODONE HYDROCHLORIDE 5 MG: 5 TABLET ORAL at 18:54

## 2025-01-28 RX ADMIN — SODIUM CHLORIDE, SODIUM LACTATE, POTASSIUM CHLORIDE, AND CALCIUM CHLORIDE 125 ML/HR: .6; .31; .03; .02 INJECTION, SOLUTION INTRAVENOUS at 10:44

## 2025-01-28 RX ADMIN — SODIUM CHLORIDE, SODIUM LACTATE, POTASSIUM CHLORIDE, AND CALCIUM CHLORIDE: .6; .31; .03; .02 INJECTION, SOLUTION INTRAVENOUS at 13:45

## 2025-01-28 RX ADMIN — ROCURONIUM BROMIDE 50 MG: 10 INJECTION, SOLUTION INTRAVENOUS at 11:38

## 2025-01-28 RX ADMIN — MIDAZOLAM 2 MG: 1 INJECTION INTRAMUSCULAR; INTRAVENOUS at 11:30

## 2025-01-28 RX ADMIN — SUGAMMADEX 200 MG: 100 INJECTION, SOLUTION INTRAVENOUS at 14:10

## 2025-01-28 RX ADMIN — FENTANYL CITRATE 50 MCG: 50 INJECTION INTRAMUSCULAR; INTRAVENOUS at 12:27

## 2025-01-28 RX ADMIN — LIDOCAINE HYDROCHLORIDE 50 MG: 10 INJECTION, SOLUTION EPIDURAL; INFILTRATION; INTRACAUDAL; PERINEURAL at 11:38

## 2025-01-28 RX ADMIN — DEXAMETHASONE SODIUM PHOSPHATE 10 MG: 10 INJECTION, SOLUTION INTRAMUSCULAR; INTRAVENOUS at 11:45

## 2025-01-28 RX ADMIN — SCOPOLAMINE 1 PATCH: 1.5 PATCH, EXTENDED RELEASE TRANSDERMAL at 10:18

## 2025-01-28 RX ADMIN — HYDROMORPHONE HYDROCHLORIDE 0.5 MG: 1 INJECTION, SOLUTION INTRAMUSCULAR; INTRAVENOUS; SUBCUTANEOUS at 13:52

## 2025-01-28 RX ADMIN — IBUPROFEN 600 MG: 600 TABLET, FILM COATED ORAL at 17:07

## 2025-01-28 RX ADMIN — FENTANYL CITRATE 50 MCG: 50 INJECTION INTRAMUSCULAR; INTRAVENOUS at 11:53

## 2025-01-28 NOTE — ANESTHESIA POSTPROCEDURE EVALUATION
Post-Op Assessment Note    CV Status:  Stable    Pain management: adequate       Mental Status:  Alert and awake   Hydration Status:  Euvolemic   PONV Controlled:  Controlled   Airway Patency:  Patent     Post Op Vitals Reviewed: Yes    No anethesia notable event occurred.    Staff: Anesthesiologist, CRNA           Last Filed PACU Vitals:  Vitals Value Taken Time   Temp 97.3 °F (36.3 °C) 01/28/25 1422   Pulse 78 01/28/25 1502   /56 01/28/25 1500   Resp 13 01/28/25 1500   SpO2 97 % 01/28/25 1502   Vitals shown include unfiled device data.    Modified Marilin:     Vitals Value Taken Time   Activity 2 01/28/25 1445   Respiration 2 01/28/25 1445   Circulation 2 01/28/25 1445   Consciousness 1 01/28/25 1445   Oxygen Saturation 2 01/28/25 1445     Modified Marilin Score: 9

## 2025-01-28 NOTE — ANESTHESIA PREPROCEDURE EVALUATION
Procedure:  BILATERAL SALPINGO-OOPHORECTOMY LAPAROSCOPIC, EXAM UNDER ANESTHESIA, POSSIBLE LAPAROSCOPIC HYSTERECTOMY (LTH) WITH SALPINGO-OOPHORECTOMY, POSSIBLE DISSESTION /STAGING LYMPH NODE LAPAROSCOPIC PELVIS/ABDOMEN (Bilateral: Abdomen)    Relevant Problems   ANESTHESIA (within normal limits)      CARDIO   (+) Migraine headache   (+) Pure hypercholesterolemia      GI/HEPATIC   (+) Gastroesophageal reflux disease      NEURO/PSYCH   (+) Migraine headache      Respiratory/Allergy   (+) Reactive airway disease      Other   (+) Adnexal mass        Physical Exam    Airway    Mallampati score: III  TM Distance: >3 FB  Neck ROM: full     Dental       Cardiovascular  Rhythm: regular, Rate: normal, Cardiovascular exam normal    Pulmonary  Pulmonary exam normal Breath sounds clear to auscultation    Other Findings  post-pubertal.      Anesthesia Plan  ASA Score- 2     Anesthesia Type- general with ASA Monitors.         Additional Monitors:     Airway Plan: LMA.           Plan Factors-Exercise tolerance (METS): >4 METS.    Chart reviewed.   Existing labs reviewed. Patient summary reviewed.    Patient is not a current smoker.              Induction- intravenous.    Postoperative Plan- Plan for postoperative opioid use. Planned trial extubation        Informed Consent- Anesthetic plan and risks discussed with patient.  I personally reviewed this patient with the CRNA. Discussed and agreed on the Anesthesia Plan with the CRNA..      NPO Status:  No vitals data found for the desired time range.

## 2025-01-28 NOTE — DISCHARGE INSTR - AVS FIRST PAGE
[facility] - Gynecologic Oncology  Johanny Booth Boulay, H[place]becker and Debra  [phone]    Discharge Instructions    DISCHARGE INSTRUCTIONS:   Contact your doctor at the number above if:   You have a fever over 101o.  You have nausea or are vomiting that does not improve after a light meal.   Your pain is getting worse, even after you take medicine.   You feel pain or burning when you urinate, or you have trouble urinating.   You have pus or a foul-smelling odor coming from your vagina.    Your wound is red, swollen, or draining pus.  You see new or an increased amount of bright red blood coming from your vagina or your incisions.   You have questions or concerns about your condition or care.    Seek care immediately:   Your arm or leg feels warm, tender, and painful. It may look swollen and red.  You have increasing abdominal or pelvic pain.   You have heavy vaginal bleeding that fills 1 or more sanitary pads in 1 hour.    Call 911 for any of the following:   You feel lightheaded, short of breath, and have chest pain.   You cough up blood.    Medicines: You may need any of the following:  Prescription medicine may be given. You may receive a prescription for pain medication or be advised to use over the counter (OTC) pain medication such as acetaminophen (Tylenol) or ibuprofen (Advil, Motrin). Ask your healthcare provider how to take this medicine safely.  NSAIDs , such as ibuprofen, help decrease swelling, pain, and fever. NSAIDs can cause stomach bleeding or kidney problems in certain people. If you take blood thinner medicine, always ask your healthcare provider if NSAIDs are safe for you. Always read the medicine label and follow directions.   Stool softeners help treat or prevent constipation.    Take your medicine as directed. Contact your healthcare provider if you think your medicine is not helping or if you have side effects. Tell him or her if you are allergic to  any medicine. Keep a list of the medicines, vitamins, and herbs you take. Include the amounts, and when and why you take them. Bring the list or the pill bottles to follow-up visits. Carry your medicine list with you in case of an emergency.    Activity:   Rest as needed. Get up and move around as directed to help prevent blood clots. Start with short walks and slowly increase the distance every day. Limit the number of times you climb stairs to 2 times each day for the first week. Plan most of your daily activities on one level of your home.      Do not lift objects heavier than 10 pounds for 6 weeks. Avoid strenuous activity for 2 weeks.      Do not strain during bowel movements. High-fiber foods and extra liquids can help you prevent constipation. Examples of high-fiber foods are fruit and bran. Prune juice and water are good liquids to drink.      Do not have sex, use tampons, or douche for up to 8 weeks.     You may shower as soon as the day after surgery.  Tub baths can be taken starting 2 weeks after surgery.Do not go into pools or hot tubs until cleared by your doctor.      Ask when it is safe for you to drive. It is generally safe to drive after 2 weeks and when no longer taking prescription pain medication.    Ask when you may return to work and to other regular activities.    Wound care: Care for your abdominal incisions as directed. Carefully wash around the wound with soap and water. If you have Hibiclens or medicated soap that you were instructed to use before surgery, you may use that to wash with for up to 2 days after surgery.  If not, any mild non-scented, non-abrasive soap is safe.  It is okay to let the soap and water run over your incision. Do not scrub your incision. Dry the area and put on new, clean bandages as directed. Change your bandages when they get wet or dirty. If you have strips of medical tape, let them fall off on their own. It may take 7 to 14 days for them to fall off. Check your  incision every day for redness, swelling, or pus.   Deep breathing: Take deep breaths and cough 10 times each hour. This will decrease your risk for a lung infection. Take a deep breath and hold it for as long as you can. Let the air out and then cough strongly. Deep breaths help open your airway. You may be given an incentive spirometer to help you take deep breaths. Put the plastic piece in your mouth and take a slow, deep breath, then let the air out and cough. Repeat these steps 10 times every hour.   Get support: This surgery may be life-changing for you and your family. You will no longer be able to get pregnant. Sudden changes in the levels of your hormones may occur and cause mood swings and depression. You may feel angry, sad, or frightened, or cry frequently and unexpectedly. These feelings are normal. Talk to your healthcare provider about where you can get support. You can also ask if hormone replacement medicine is right for you.   Follow up with your healthcare provider or gynecologist as directed: You may need to return to have stitches removed, and for other tests. Write down your questions so you remember to ask them during your visits.      © 2017 [employer] Information is for End User's use only and may not be sold, redistributed or otherwise used for commercial purposes. All illustrations and images included in CareNotes® are the copyrighted property of [employer] or [employer].  The above information is an  only. It is not intended as medical advice for individual conditions or treatments. Talk to your doctor, nurse or pharmacist before following any medical regimen to see if it is safe and effective for you.

## 2025-01-28 NOTE — OP NOTE
OPERATIVE REPORT  PATIENT NAME: Nataly Shea    :  1965  MRN: [id number]  Pt Location: BE OR ROOM 04    SURGERY DATE: 2025    Surgeons and Role:     * Lorena Michael MD - Primary     * Brittany Corona MD - Assisting     * Jin Hampton MD - Assisting    Preop Diagnosis:  Adnexal mass [N94.89]    Post-Op Diagnosis Codes:     * Adnexal mass [N94.89]    Procedure(s):  Bilateral - BILATERAL SALPINGO-OOPHORECTOMY LAPAROSCOPIC. EXAM UNDER ANESTHESIA    Specimen(s):  ID Type Source Tests Collected by Time Destination   1 :  Washing Pelvic Washing NON-GYNECOLOGIC CYTOLOGY Lorena Michael MD 2025 1245    2 : and fallopian tube Tissue Ovary, Right TISSUE EXAM Lorena Michael MD 2025 1253    3 : and fallopian tube Tissue Ovary, Left TISSUE EXAM Lorena Michael MD 2025 1254        Estimated Blood Loss:   Minimal    Drains:  Urethral Catheter Latex;Straight-tip 16 Fr. (Active)   Number of days: 0       Anesthesia Type:   General    Operative Indications:  Ms Shea is a 58yo with hx of bilateral ovarian cysts, 3cm each. She has a good understanding and has agreed to proceed with definitive surgical management with a laparoscopic hysterectomy, bilateral salpingectomy and possible hysterectomy and staging.      Operative Findings:  Atraumatic entry.  Grossly normal abdominal survey.  Normal-appearing liver, although it was adherent to the anterior abdominal wall.  Normal bowel, omentum, gallbladder.   Bilateral ovaries with multiple small, approximately 1 cm cysts.  Diffuse and dense adhesions of the posterior uterus to the sigmoid colon, bilateral ovarian and fallopian tubes.  Both ureters visualized vermiculation throughout the case.  Hemostasis at the conclusion of the case.  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  Benign pathology on the right.    Complications:   None    Procedure  and Technique:  The patient was met in the pre-operative holding area where consents and procedures were reviewed and all questions answered.  She was brought to the Operating Room where general anesthesia was induced. The abdomen, vagina and perineum were prepped and draped. .    Jarquin's point was identified, injected with lidocaine, and a 5 mm incision was made.  A laparoscope was attempted to be placed under direct visualization however it was difficult to clearly visualize the peritoneum.  An incision was made supraumbilically, at the existing transverse incision.  The laparoscope was placed under direct visualization with an Optiview trocar.  There was no visceral damage.  Correct placement was confirmed visually and with low pressure. The peritoneal cavity was then insufflated with carbon dioxide on high flow to maintain a pressure of 15 mm Hg throughout the case.  Two additional 5 mm trocars were placed in the bilateral lower quadrants.      Peritoneal washings were obtained at this time. Procedure was completed bilaterally identically.  Adhesions of the fallopian tubes, ovaries, and sigmoid colon were carefully dissected with both blunt and electrocautery in effort to restore normal anatomy.  The retroperitoneal space was entered and the ureters were then identified retroperitoneally, coursing well posterior to the ovarian vessels. Ovarian vascular pedicles were skeletonized, cauterized with bipolar cautery and divided.  Adnexa was then mobilized off the pelvic side wall and ultimately freed from uterine fundus with bipolar cautery. Adnexa were placed into endocatch bag and removed intact.  All vascular pedicles were examined and found to be hemostatic.  Surgicel was placed over the left mesosalpinx.    The laparoscopic instruments were removed. The laparoscopic skin incisions were irrigated and noted to be hemostatic. The right lower quadrant port site was closed with 0-Vicryl stitch on fascial closure  device. The skin incisions were closed with subcuticular stitches of 4-0 Biosyn.  Incisions were infiltrated with 0.5% marcaine    All sponge, needle and instrument counts were correct x2.  Anesthesia was reversed and the patient was taken to the PACU in a stable condition.    Dr. Michael was present for the entire prodecure.    Patient Disposition:  PACU     SIGNATURE: Brittany Corona MD  DATE: January 28, 2025  TIME: 2:17 PM

## 2025-01-28 NOTE — INTERVAL H&P NOTE
H&P reviewed. After examining the patient I find no changes in the patients condition since the H&P had been written.    Vitals:    01/28/25 1004   BP: 142/66   Pulse: 66   Resp: 17   Temp: (!) 96.9 °F (36.1 °C)   SpO2: 100%     Exam:  Gen - Aox3, comfortable, no distress  CV - RRR, no m/r/g  Lungs - CTAB  Abd - soft, NTND      Lorena Michael MD, MPH  Division of Gynecologic Oncology  01/28/25 10:19 AM

## 2025-01-29 DIAGNOSIS — J45.21 MILD INTERMITTENT REACTIVE AIRWAY DISEASE WITH ACUTE EXACERBATION: ICD-10-CM

## 2025-01-29 DIAGNOSIS — H20.9 UVEITIS: ICD-10-CM

## 2025-01-29 RX ORDER — ALBUTEROL SULFATE 90 UG/1
2 INHALANT RESPIRATORY (INHALATION) EVERY 4 HOURS PRN
Qty: 6.7 G | Refills: 5 | Status: SHIPPED | OUTPATIENT
Start: 2025-01-29

## 2025-01-29 RX ORDER — KETOROLAC TROMETHAMINE 4 MG/ML
1 SOLUTION/ DROPS OPHTHALMIC 4 TIMES DAILY
Qty: 5 ML | Refills: 0 | Status: SHIPPED | OUTPATIENT
Start: 2025-01-29

## 2025-02-03 PROCEDURE — 88112 CYTOPATH CELL ENHANCE TECH: CPT | Performed by: PATHOLOGY

## 2025-02-05 PROCEDURE — 88341 IMHCHEM/IMCYTCHM EA ADD ANTB: CPT | Performed by: PATHOLOGY

## 2025-02-05 PROCEDURE — 88342 IMHCHEM/IMCYTCHM 1ST ANTB: CPT | Performed by: PATHOLOGY

## 2025-02-05 PROCEDURE — 88307 TISSUE EXAM BY PATHOLOGIST: CPT | Performed by: PATHOLOGY

## 2025-02-05 PROCEDURE — 88313 SPECIAL STAINS GROUP 2: CPT | Performed by: PATHOLOGY

## 2025-02-14 ENCOUNTER — TELEPHONE (OUTPATIENT)
Dept: SURGICAL ONCOLOGY | Facility: CLINIC | Age: 60
End: 2025-02-14

## 2025-02-14 ENCOUNTER — OFFICE VISIT (OUTPATIENT)
Dept: GYNECOLOGIC ONCOLOGY | Facility: CLINIC | Age: 60
End: 2025-02-14

## 2025-02-14 VITALS
SYSTOLIC BLOOD PRESSURE: 128 MMHG | OXYGEN SATURATION: 98 % | WEIGHT: 177 LBS | DIASTOLIC BLOOD PRESSURE: 88 MMHG | BODY MASS INDEX: 27.78 KG/M2 | HEART RATE: 55 BPM | TEMPERATURE: 96.5 F | HEIGHT: 67 IN

## 2025-02-14 DIAGNOSIS — R10.2 PELVIC PAIN: ICD-10-CM

## 2025-02-14 DIAGNOSIS — N94.89 ADNEXAL MASS: Primary | ICD-10-CM

## 2025-02-14 PROCEDURE — 99024 POSTOP FOLLOW-UP VISIT: CPT | Performed by: OBSTETRICS & GYNECOLOGY

## 2025-02-14 NOTE — ASSESSMENT & PLAN NOTE
- S/p laparoscopic BSO on 1/28/25  - We reviewed intraoperative findings notable for significant pelvic adhesions which may explain some of her pelvic pain

## 2025-02-14 NOTE — ASSESSMENT & PLAN NOTE
- S/p laparoscopic BSO on 1/28/25  - We reviewed benign surgical pathology  - Meeting expected postop milestones  - Reviewed lifting restrictions x 4 weeks. Can otherwise return to normal activities  - Expected RTC 4 weeks postop    Given benign pathology and meeting postop milestones, can follow-up with gyn oncology as needed. Discussed recommendation for ongoing yearly WWE.

## 2025-02-21 ENCOUNTER — OFFICE VISIT (OUTPATIENT)
Dept: FAMILY MEDICINE CLINIC | Facility: CLINIC | Age: 60
End: 2025-02-21
Payer: COMMERCIAL

## 2025-02-21 VITALS
HEART RATE: 80 BPM | TEMPERATURE: 98 F | SYSTOLIC BLOOD PRESSURE: 126 MMHG | OXYGEN SATURATION: 97 % | DIASTOLIC BLOOD PRESSURE: 80 MMHG | BODY MASS INDEX: 27.35 KG/M2 | WEIGHT: 174.6 LBS

## 2025-02-21 DIAGNOSIS — J45.31 MILD PERSISTENT REACTIVE AIRWAY DISEASE WITH ACUTE EXACERBATION: ICD-10-CM

## 2025-02-21 DIAGNOSIS — J18.9 COMMUNITY ACQUIRED PNEUMONIA OF LEFT LOWER LOBE OF LUNG: Primary | ICD-10-CM

## 2025-02-21 DIAGNOSIS — J11.1 INFLUENZA: ICD-10-CM

## 2025-02-21 PROCEDURE — 99214 OFFICE O/P EST MOD 30 MIN: CPT | Performed by: FAMILY MEDICINE

## 2025-02-21 RX ORDER — AZITHROMYCIN 250 MG/1
TABLET, FILM COATED ORAL
Qty: 6 TABLET | Refills: 0 | Status: SHIPPED | OUTPATIENT
Start: 2025-02-21 | End: 2025-02-26

## 2025-02-21 RX ORDER — OSELTAMIVIR PHOSPHATE 75 MG/1
75 CAPSULE ORAL 2 TIMES DAILY
Qty: 10 CAPSULE | Refills: 0 | Status: SHIPPED | OUTPATIENT
Start: 2025-02-21 | End: 2025-02-26

## 2025-02-21 NOTE — ASSESSMENT & PLAN NOTE
Continue with the air duo as she has been currently using it, twice a day.  With regard to albuterol, she can use that up to every 4 hours as needed for wheezing and shortness of breath and coughing.

## 2025-02-21 NOTE — PROGRESS NOTES
Name: Nataly Shea      : 1965      MRN: [id number]  Encounter Provider: Helder Giordano MD  Encounter Date: 2025   Encounter department: [facility]  :  Assessment & Plan  Community acquired pneumonia of left lower lobe of lung    Orders:  •  azithromycin (ZITHROMAX) 250 mg tablet; Take 2 tablets on day 1, then 1 tablet daily days 2 through 5    Influenza    Orders:  •  oseltamivir (TAMIFLU) 75 mg capsule; Take 1 capsule (75 mg total) by mouth 2 (two) times a day for 5 days    Mild persistent reactive airway disease with acute exacerbation  Continue with the air duo as she has been currently using it, twice a day.  With regard to albuterol, she can use that up to every 4 hours as needed for wheezing and shortness of breath and coughing.                 1. Community acquired pneumonia of left lower lobe of lung  Comments:  Recommend Zithromax.  Follow in 2 weeks if needed.  Mucinex over-the-counter.  Encourage deep breathing.  Orders:  -     azithromycin (ZITHROMAX) 250 mg tablet; Take 2 tablets on day 1, then 1 tablet daily days 2 through 5  2. Influenza  Comments:  Positive exposure with multiple family members.  Recommend treatment with Tamiflu empirically.  Orders:  -     oseltamivir (TAMIFLU) 75 mg capsule; Take 1 capsule (75 mg total) by mouth 2 (two) times a day for 5 days  3. Mild persistent reactive airway disease with acute exacerbation  Assessment & Plan:  Continue with the air duo as she has been currently using it, twice a day.  With regard to albuterol, she can use that up to every 4 hours as needed for wheezing and shortness of breath and coughing.               Chief Complaint   Patient presents with   • Cough   • Headache     Onset 10/19/24   • Fever     102.4 was 25- pt tested for covid on 25 was neg  and did flu too was neg            History of Present Illness   Patient noted increased sometimes starting on Wednesday.  On Thursday she tested for  COVID and flu.  Both were negative.  These were home test.  She has been exposed to influenza, however with daughter and grandchildren.      Has been using inhaler for this, as she has some shortness of breath.  Had high fever, increased cough.  Congestion.  Some body aches, but not significant enough to speak of.    She was also worried about thrush.  This is because she is using inhalers, but was not able to rinse her mouth out as aggressively as she would like.  She also has some odd taste in the mouth with the current situation.    Fever - 9 weeks to 74 years   This is a new problem. The current episode started in the past 7 days. The problem occurs constantly. The problem has been waxing and waning. The maximum temperature noted was 102 to 102.9 F. The temperature was taken using an oral thermometer. Associated symptoms include congestion, coughing, headaches, muscle aches, nausea, sleepiness, a sore throat and wheezing. Pertinent negatives include no abdominal pain, chest pain, diarrhea, ear pain, rash, urinary pain or vomiting.   Risk factors: hx of cancer and sick contacts    Risk factors: no contaminated food, no contaminated water, no immunosuppression, no occupational exposure, no recent sickness and no recent travel      Review of Systems   Constitutional:  Positive for fever.   HENT:  Positive for congestion and sore throat. Negative for ear pain.    Respiratory:  Positive for cough and wheezing.    Cardiovascular:  Negative for chest pain.   Gastrointestinal:  Positive for nausea. Negative for abdominal pain, diarrhea and vomiting.   Genitourinary:  Negative for dysuria.   Skin:  Negative for rash.   Neurological:  Positive for headaches.       Objective   /80 (BP Location: Right arm, Patient Position: Sitting, Cuff Size: Adult)   Pulse 80   Temp 98 °F (36.7 °C) (Tympanic)   Wt 79.2 kg (174 lb 9.6 oz)   SpO2 97%   BMI 27.35 kg/m²      Physical Exam  Vitals and nursing note reviewed.    Constitutional:       Appearance: Normal appearance. She is well-developed. She is ill-appearing.   HENT:      Head: Normocephalic and atraumatic.      Comments: Oropharynx appears normal.  Tongue without any signs of thrush at this point.  Cardiovascular:      Rate and Rhythm: Normal rate and regular rhythm.      Pulses:           Carotid pulses are 2+ on the right side and 2+ on the left side.     Heart sounds: Normal heart sounds.   Pulmonary:      Effort: Pulmonary effort is normal.      Breath sounds: Decreased breath sounds present. No wheezing or rales.       Chest:      Chest wall: No tenderness.   Neurological:      Mental Status: She is alert.

## 2025-02-21 NOTE — PATIENT INSTRUCTIONS
1. Community acquired pneumonia of left lower lobe of lung  Comments:  Recommend Zithromax.  Follow in 2 weeks if needed.  Mucinex over-the-counter.  Encourage deep breathing.  Orders:  -     azithromycin (ZITHROMAX) 250 mg tablet; Take 2 tablets on day 1, then 1 tablet daily days 2 through 5  2. Influenza  Comments:  Positive exposure with multiple family members.  Recommend treatment with Tamiflu empirically.  Orders:  -     oseltamivir (TAMIFLU) 75 mg capsule; Take 1 capsule (75 mg total) by mouth 2 (two) times a day for 5 days  3. Mild persistent reactive airway disease with acute exacerbation  Assessment & Plan:  Continue with the air duo as she has been currently using it, twice a day.  With regard to albuterol, she can use that up to every 4 hours as needed for wheezing and shortness of breath and coughing.               COVID 19 Instructions    Nataly Shea was advised to limit contact with others to essential tasks such as getting food, medications, and medical care.    Proper handwashing reviewed, and Hand sanitzer when washing is not available.    If the patient develops symptoms of COVID 19, the patient should call the office as soon as possible.    It is strongly recommended that Flu Vaccinations be obtained.      Virtual Visits:  [place]: This works on smart phones (any phone with Internet browsing capability).  You should get a text message when the provider is ready to see you.  Click on the link in the text message, and the call should start.  You will need to type in your name, and allow camera and microphone access.  This is HIPPA compliant, and secure.      If you have not already done so, get immunized to COVID 19.      We are committed to getting you vaccinated as soon as possible and will be closely following CDC and [facility] guidelines as they are released and revised.  Please refer to our COVID-19 vaccine webpage for the most up to date information on the vaccine and our distribution  efforts.    This site will also have the most up to date recommendations for COVID booster vaccine.    [email or web address]    Call 5-193-XDCDUKU ([phone]), option 7    You can also visit https://www.vaccines.gov/ to find vaccines in your area.    OUR LOCATION:    [facility]  [address] [place] [address], [address]  [place], [place], [address] [phone]  Fax: [phone]    Lab services, Rheumatology, and OB/GYN are at this location as well.

## 2025-02-26 ENCOUNTER — TELEPHONE (OUTPATIENT)
Dept: GYNECOLOGIC ONCOLOGY | Facility: CLINIC | Age: 60
End: 2025-02-26

## 2025-05-23 ENCOUNTER — OFFICE VISIT (OUTPATIENT)
Dept: FAMILY MEDICINE CLINIC | Facility: CLINIC | Age: 60
End: 2025-05-23

## 2025-05-23 ENCOUNTER — APPOINTMENT (OUTPATIENT)
Dept: LAB | Facility: CLINIC | Age: 60
End: 2025-05-23
Payer: COMMERCIAL

## 2025-05-23 ENCOUNTER — TRANSCRIBE ORDERS (OUTPATIENT)
Dept: LAB | Facility: CLINIC | Age: 60
End: 2025-05-23

## 2025-05-23 VITALS
OXYGEN SATURATION: 97 % | HEART RATE: 62 BPM | DIASTOLIC BLOOD PRESSURE: 80 MMHG | BODY MASS INDEX: 27.31 KG/M2 | SYSTOLIC BLOOD PRESSURE: 122 MMHG | WEIGHT: 174 LBS | HEIGHT: 67 IN | TEMPERATURE: 98.1 F | RESPIRATION RATE: 18 BRPM

## 2025-05-23 DIAGNOSIS — Z00.8 HEALTH EXAMINATION IN POPULATION SURVEYS: ICD-10-CM

## 2025-05-23 DIAGNOSIS — I49.9 CARDIAC ARRHYTHMIA, UNSPECIFIED CARDIAC ARRHYTHMIA TYPE: ICD-10-CM

## 2025-05-23 DIAGNOSIS — Z00.8 HEALTH EXAMINATION IN POPULATION SURVEYS: Primary | ICD-10-CM

## 2025-05-23 DIAGNOSIS — H20.9 UVEITIS: Primary | ICD-10-CM

## 2025-05-23 DIAGNOSIS — Z29.11 NEED FOR RSV VACCINATION: ICD-10-CM

## 2025-05-23 DIAGNOSIS — K21.9 GASTROESOPHAGEAL REFLUX DISEASE WITHOUT ESOPHAGITIS: ICD-10-CM

## 2025-05-23 DIAGNOSIS — Z23 NEED FOR COVID-19 VACCINE: ICD-10-CM

## 2025-05-23 DIAGNOSIS — Z23 NEED FOR VACCINATION FOR STREP PNEUMONIAE: ICD-10-CM

## 2025-05-23 DIAGNOSIS — G43.809 OTHER MIGRAINE WITHOUT STATUS MIGRAINOSUS, NOT INTRACTABLE: ICD-10-CM

## 2025-05-23 DIAGNOSIS — Z12.31 ENCOUNTER FOR SCREENING MAMMOGRAM FOR BREAST CANCER: ICD-10-CM

## 2025-05-23 DIAGNOSIS — Z00.00 ANNUAL PHYSICAL EXAM: ICD-10-CM

## 2025-05-23 DIAGNOSIS — Z12.11 SCREEN FOR COLON CANCER: ICD-10-CM

## 2025-05-23 DIAGNOSIS — E78.00 PURE HYPERCHOLESTEROLEMIA: ICD-10-CM

## 2025-05-23 DIAGNOSIS — J45.20 MILD INTERMITTENT REACTIVE AIRWAY DISEASE WITHOUT COMPLICATION: ICD-10-CM

## 2025-05-23 LAB
ALBUMIN SERPL BCG-MCNC: 4.1 G/DL (ref 3.5–5)
ALP SERPL-CCNC: 81 U/L (ref 34–104)
ALT SERPL W P-5'-P-CCNC: 11 U/L (ref 7–52)
ANION GAP SERPL CALCULATED.3IONS-SCNC: 6 MMOL/L (ref 4–13)
AST SERPL W P-5'-P-CCNC: 15 U/L (ref 13–39)
BASOPHILS # BLD AUTO: 0.02 THOUSANDS/ÂΜL (ref 0–0.1)
BASOPHILS NFR BLD AUTO: 0 % (ref 0–1)
BILIRUB SERPL-MCNC: 0.36 MG/DL (ref 0.2–1)
BUN SERPL-MCNC: 15 MG/DL (ref 5–25)
CALCIUM SERPL-MCNC: 9.5 MG/DL (ref 8.4–10.2)
CHLORIDE SERPL-SCNC: 105 MMOL/L (ref 96–108)
CHOLEST SERPL-MCNC: 202 MG/DL (ref ?–200)
CO2 SERPL-SCNC: 30 MMOL/L (ref 21–32)
CREAT SERPL-MCNC: 0.82 MG/DL (ref 0.6–1.3)
EOSINOPHIL # BLD AUTO: 0.16 THOUSAND/ÂΜL (ref 0–0.61)
EOSINOPHIL NFR BLD AUTO: 3 % (ref 0–6)
ERYTHROCYTE [DISTWIDTH] IN BLOOD BY AUTOMATED COUNT: 13.4 % (ref 11.6–15.1)
EST. AVERAGE GLUCOSE BLD GHB EST-MCNC: 117 MG/DL
GFR SERPL CREATININE-BSD FRML MDRD: 78 ML/MIN/1.73SQ M
GLUCOSE SERPL-MCNC: 82 MG/DL (ref 65–140)
HBA1C MFR BLD: 5.7 %
HCT VFR BLD AUTO: 43.3 % (ref 34.8–46.1)
HDLC SERPL-MCNC: 89 MG/DL
HGB BLD-MCNC: 14.1 G/DL (ref 11.5–15.4)
IMM GRANULOCYTES # BLD AUTO: 0.02 THOUSAND/UL (ref 0–0.2)
IMM GRANULOCYTES NFR BLD AUTO: 0 % (ref 0–2)
LDLC SERPL CALC-MCNC: 93 MG/DL (ref 0–100)
LYMPHOCYTES # BLD AUTO: 1.49 THOUSANDS/ÂΜL (ref 0.6–4.47)
LYMPHOCYTES NFR BLD AUTO: 27 % (ref 14–44)
MCH RBC QN AUTO: 30.4 PG (ref 26.8–34.3)
MCHC RBC AUTO-ENTMCNC: 32.6 G/DL (ref 31.4–37.4)
MCV RBC AUTO: 93 FL (ref 82–98)
MONOCYTES # BLD AUTO: 0.55 THOUSAND/ÂΜL (ref 0.17–1.22)
MONOCYTES NFR BLD AUTO: 10 % (ref 4–12)
NEUTROPHILS # BLD AUTO: 3.33 THOUSANDS/ÂΜL (ref 1.85–7.62)
NEUTS SEG NFR BLD AUTO: 60 % (ref 43–75)
NONHDLC SERPL-MCNC: 113 MG/DL
NRBC BLD AUTO-RTO: 0 /100 WBCS
PLATELET # BLD AUTO: 267 THOUSANDS/UL (ref 149–390)
PMV BLD AUTO: 10.5 FL (ref 8.9–12.7)
POTASSIUM SERPL-SCNC: 4.1 MMOL/L (ref 3.5–5.3)
PROT SERPL-MCNC: 7.5 G/DL (ref 6.4–8.4)
RBC # BLD AUTO: 4.64 MILLION/UL (ref 3.81–5.12)
SODIUM SERPL-SCNC: 141 MMOL/L (ref 135–147)
TRIGL SERPL-MCNC: 98 MG/DL (ref ?–150)
TSH SERPL DL<=0.05 MIU/L-ACNC: 3.96 UIU/ML (ref 0.45–4.5)
WBC # BLD AUTO: 5.57 THOUSAND/UL (ref 4.31–10.16)

## 2025-05-23 PROCEDURE — 84443 ASSAY THYROID STIM HORMONE: CPT

## 2025-05-23 PROCEDURE — 85025 COMPLETE CBC W/AUTO DIFF WBC: CPT

## 2025-05-23 PROCEDURE — 80053 COMPREHEN METABOLIC PANEL: CPT

## 2025-05-23 PROCEDURE — 83036 HEMOGLOBIN GLYCOSYLATED A1C: CPT

## 2025-05-23 PROCEDURE — 36415 COLL VENOUS BLD VENIPUNCTURE: CPT

## 2025-05-23 PROCEDURE — 80061 LIPID PANEL: CPT

## 2025-05-23 RX ORDER — KETOROLAC TROMETHAMINE 4 MG/ML
1 SOLUTION/ DROPS OPHTHALMIC 4 TIMES DAILY
Qty: 5 ML | Refills: 0 | Status: SHIPPED | OUTPATIENT
Start: 2025-05-23

## 2025-05-23 NOTE — ASSESSMENT & PLAN NOTE
Noted previously.  Last cholesterol was quite good.  I would encourage her to complete the screening for employment, which should give her the results for cholesterol in the future.

## 2025-05-23 NOTE — PROGRESS NOTES
Adult Annual Physical  Name: Nataly Shea      : 1965      MRN: [id number]  Encounter Provider: Helder Giordano MD  Encounter Date: 2025   Encounter department: [facility]IMARY [facility]ARE    :  Assessment & Plan  Uveitis  Increased redness and swelling lately.  She is using Zaditor over-the-counter, but also asking for refill on Acular, which would be quite reasonable.  Has not been to ophthalmology lately.  Will refer.  Orders:  •  ketorolac (ACULAR) 0.4 % SOLN; Administer 1 drop to both eyes 4 (four) times a day  •  Ambulatory Referral to Ophthalmology; Future    Pure hypercholesterolemia  Noted previously.  Last cholesterol was quite good.  I would encourage her to complete the screening for employment, which should give her the results for cholesterol in the future.       Gastroesophageal reflux disease without esophagitis  Minimal to no problems right now.  Only occasional as needed meds.       Mild intermittent reactive airway disease without complication  No current symptoms or problems.       Other migraine without status migrainosus, not intractable  Rare headaches.  No need for treatment at the moment.       Cardiac arrhythmia, unspecified cardiac arrhythmia type    Orders:  •  POCT ECG  •  Ambulatory Referral to Cardiology; Future  •  TSH, 3rd generation; Future  •  CBC and differential; Future  •  Comprehensive metabolic panel; Future    Annual physical exam         Screen for colon cancer    Orders:  •  Ambulatory Referral to Gastroenterology; Future    Need for vaccination for Strep pneumoniae         Encounter for screening mammogram for breast cancer    Orders:  •  Mammo screening bilateral w 3d and cad; Future    Need for COVID-19 vaccine         Need for RSV vaccination             Preventive Screenings:    - Breast cancer screening: screening up-to-date     Immunizations:  - Immunizations due: Prevnar 20       1. Uveitis  Assessment & Plan:  Increased redness and  swelling lately.  She is using Zaditor over-the-counter, but also asking for refill on Acular, which would be quite reasonable.  Has not been to ophthalmology lately.  Will refer.  Orders:  •  ketorolac (ACULAR) 0.4 % SOLN; Administer 1 drop to both eyes 4 (four) times a day  •  Ambulatory Referral to Ophthalmology; Future    Orders:  -     ketorolac (ACULAR) 0.4 % SOLN; Administer 1 drop to both eyes 4 (four) times a day  -     Ambulatory Referral to Ophthalmology; Future  2. Pure hypercholesterolemia  Assessment & Plan:  Noted previously.  Last cholesterol was quite good.  I would encourage her to complete the screening for employment, which should give her the results for cholesterol in the future.       3. Gastroesophageal reflux disease without esophagitis  Assessment & Plan:  Minimal to no problems right now.  Only occasional as needed meds.       4. Mild intermittent reactive airway disease without complication  Assessment & Plan:  No current symptoms or problems.       5. Other migraine without status migrainosus, not intractable  Assessment & Plan:  Rare headaches.  No need for treatment at the moment.       6. Cardiac arrhythmia, unspecified cardiac arrhythmia type  Comments:  Exam showed some changes.  Recommend EKG in office..  PACs noted, possible QT prolongation.  Recommend cardiology.  Orders:  -     POCT ECG  -     Ambulatory Referral to Cardiology; Future  -     TSH, 3rd generation; Future; Expected date: 05/23/2025  -     CBC and differential; Future  -     Comprehensive metabolic panel; Future; Expected date: 05/23/2025  7. Annual physical exam  Comments:  Patient's physical exam essentially normal, with the exception of the irregular heartbeat.  8. Screen for colon cancer  -     Ambulatory Referral to Gastroenterology; Future  9. Need for vaccination for Strep pneumoniae  Comments:  Recommend Prevnar.  10. Encounter for screening mammogram for breast cancer  -     Mammo screening bilateral w 3d  and cad; Future; Expected date: 05/23/2025  11. Need for COVID-19 vaccine  Comments:  Consider COVID-vaccine per CDC guidelines.  12. Need for RSV vaccination  Comments:  Consider RSV vaccine at local pharmacy.    Chief Complaint   Patient presents with   • Physical Exam       History of Present Illness     Adult Annual Physical:  Patient presents for annual physical. Patient is here for annual physical.  She did mention that she also has some itchiness in her eyes.  Has had this before.  Patient has been using Zaditor eyedrops.  Just started that yesterday.  Has also used an eyewash.  Again, not sure about that.    Hyperlipidemia: Noted before.  Reviewed labs from before.    Migraine headaches: Noted in the past.  Patient reports she rarely if ever has a symptom.  Sometimes it is with changes in barometric pressure.    GERD: Patient reports she really does not have significant problems with reflux.  Has used famotidine or Pepto on occasion, but really for the most part is not having any symptoms.  .     Diet and Physical Activity:  - Diet/Nutrition: heart healthy (low sodium) diet, consuming 3-5 servings of fruits/vegetables daily, adequate fiber intake and adequate whole grain intake.  - Exercise: moderate cardiovascular exercise, 5-7 times a week on average and 30-60 minutes on average.    General Health:  - Sleep: 7-8 hours of sleep on average and snores loudly.  - Hearing: normal hearing bilateral ears.  - Vision: most recent eye exam < 1 year ago and wears glasses and contacts.  - Dental: regular dental visits and brushes teeth three times daily.    /GYN Health:  - Follows with GYN: yes.   - Menopause: postmenopausal.   - History of STDs: no  - Contraception: menopause.      Advanced Care Planning:  - Has an advanced directive?: no    - Has a durable medical POA?: no    - ACP document given to patient?: no      Review of Systems      Objective   /80 (BP Location: Right arm, Patient Position: Sitting,  "Cuff Size: Adult)   Pulse 62   Temp 98.1 °F (36.7 °C) (Tympanic)   Resp 18   Ht 5' 7\" (1.702 m)   Wt 78.9 kg (174 lb)   SpO2 97%   BMI 27.25 kg/m²     Physical Exam  Vitals and nursing note reviewed.   Constitutional:       General: She is not in acute distress.     Appearance: She is well-developed. She is not diaphoretic.   HENT:      Head: Normocephalic and atraumatic.      Right Ear: Hearing, tympanic membrane, ear canal and external ear normal.      Left Ear: Hearing, tympanic membrane, ear canal and external ear normal.      Nose: Nose normal.      Right Sinus: No maxillary sinus tenderness or frontal sinus tenderness.      Left Sinus: No maxillary sinus tenderness or frontal sinus tenderness.      Mouth/Throat:      Pharynx: Uvula midline. No oropharyngeal exudate.     Eyes:      General: Lids are everted, no foreign bodies appreciated.      Conjunctiva/sclera: Conjunctivae normal.      Pupils: Pupils are equal, round, and reactive to light.     Neck:      Thyroid: No thyromegaly.      Vascular: No carotid bruit.      Trachea: No tracheal deviation.     Cardiovascular:      Rate and Rhythm: Normal rate. Rhythm regularly irregular.      Pulses:           Carotid pulses are 2+ on the right side and 2+ on the left side.     Heart sounds: Normal heart sounds. No murmur heard.     No friction rub. No gallop.   Pulmonary:      Effort: Pulmonary effort is normal. No respiratory distress.      Breath sounds: Normal breath sounds. No wheezing or rales.   Abdominal:      General: Bowel sounds are normal. There is no distension.      Palpations: Abdomen is soft.      Tenderness: There is no abdominal tenderness.     Musculoskeletal:      Cervical back: Normal range of motion and neck supple.   Lymphadenopathy:      Cervical: No cervical adenopathy.     Skin:     General: Skin is warm and dry.     Neurological:      Mental Status: She is alert and oriented to person, place, and time.      Coordination: " Coordination normal.     Psychiatric:         Behavior: Behavior normal.         Thought Content: Thought content normal.         Judgment: Judgment normal.

## 2025-05-23 NOTE — ASSESSMENT & PLAN NOTE
Increased redness and swelling lately.  She is using Zaditor over-the-counter, but also asking for refill on Acular, which would be quite reasonable.  Has not been to ophthalmology lately.  Will refer.  Orders:  •  ketorolac (ACULAR) 0.4 % SOLN; Administer 1 drop to both eyes 4 (four) times a day  •  Ambulatory Referral to Ophthalmology; Future

## 2025-05-23 NOTE — PATIENT INSTRUCTIONS
1. Uveitis  Assessment & Plan:  Increased redness and swelling lately.  She is using Zaditor over-the-counter, but also asking for refill on Acular, which would be quite reasonable.  Has not been to ophthalmology lately.  Will refer.  Orders:    ketorolac (ACULAR) 0.4 % SOLN; Administer 1 drop to both eyes 4 (four) times a day    Ambulatory Referral to Ophthalmology; Future    Orders:  -     ketorolac (ACULAR) 0.4 % SOLN; Administer 1 drop to both eyes 4 (four) times a day  -     Ambulatory Referral to Ophthalmology; Future  2. Pure hypercholesterolemia  Assessment & Plan:  Noted previously.  Last cholesterol was quite good.  I would encourage her to complete the screening for employment, which should give her the results for cholesterol in the future.       3. Gastroesophageal reflux disease without esophagitis  Assessment & Plan:  Minimal to no problems right now.  Only occasional as needed meds.       4. Mild intermittent reactive airway disease without complication  Assessment & Plan:  No current symptoms or problems.       5. Other migraine without status migrainosus, not intractable  Assessment & Plan:  Rare headaches.  No need for treatment at the moment.       6. Cardiac arrhythmia, unspecified cardiac arrhythmia type  Comments:  Exam showed some changes.  Recommend EKG in office..  PACs noted, possible QT prolongation.  Recommend cardiology.  Orders:  -     POCT ECG  -     Ambulatory Referral to Cardiology; Future  -     TSH, 3rd generation; Future; Expected date: 05/23/2025  -     CBC and differential; Future  -     Comprehensive metabolic panel; Future; Expected date: 05/23/2025  7. Annual physical exam  Comments:  Patient's physical exam essentially normal, with the exception of the irregular heartbeat.  8. Screen for colon cancer  -     Ambulatory Referral to Gastroenterology; Future  9. Need for vaccination for Strep pneumoniae  Comments:  Recommend Prevnar.  10. Encounter for screening mammogram for  breast cancer  -     Mammo screening bilateral w 3d and cad; Future; Expected date: 05/23/2025  11. Need for COVID-19 vaccine  Comments:  Consider COVID-vaccine per CDC guidelines.  12. Need for RSV vaccination  Comments:  Consider RSV vaccine at local pharmacy.      COVID 19 Instructions    Nataly Shea was advised to limit contact with others to essential tasks such as getting food, medications, and medical care.    Proper handwashing reviewed, and Hand sanitzer when washing is not available.    If the patient develops symptoms of COVID 19, the patient should call the office as soon as possible.    It is strongly recommended that Flu Vaccinations be obtained.      Virtual Visits:  You should get a text message when the provider is ready to see you.  Click on the link in the text message, and the call should start.  Make sure you allow camera and microphone access.  This is HIPPA compliant, and secure.  Also, you could access this visit from [employer] in the [facility] radha.      If you have not already done so, get immunized to COVID 19.      We are committed to getting you vaccinated as soon as possible and will be closely following CDC and [facility] guidelines as they are released and revised.  Please refer to our COVID-19 vaccine webpage for the most up to date information on the vaccine and our distribution efforts.    This site will also have the most up to date recommendations for COVID booster vaccine.    https://www.slhn.org/covid-19/protect-yourself/covid-19-vaccine    Call [phone]-[phone] ([phone]), option 7    You can also visit https://www.vaccines.gov/ to find vaccines in your area.    OUR LOCATION:    [facility]  [address] [place] [address], [address]  [place], [place], [address]  [phone]  Fax: [phone]    Lab services, Rheumatology, and OB/GYN are at this location as well.      Patient Education     Routine physical for adults   The Basics   Written by the doctors and editors at  "UpToDate   What is a physical? -- A physical is a routine visit, or \"check-up,\" with your doctor. You might also hear it called a \"wellness visit\" or \"preventive visit.\"  During each visit, the doctor will:   Ask about your physical and mental health   Ask about your habits, behaviors, and lifestyle   Do an exam   Give you vaccines if needed   Talk to you about any medicines you take   Give advice about your health   Answer your questions  Getting regular check-ups is an important part of taking care of your health. It can help your doctor find and treat any problems you have. But it's also important for preventing health problems.  A routine physical is different from a \"sick visit.\" A sick visit is when you see a doctor because of a health concern or problem. Since physicals are scheduled ahead of time, you can think about what you want to ask the doctor.  How often should I get a physical? -- It depends on your age and health. In general, for people age 21 years and older:   If you are younger than 50 years, you might be able to get a physical every 3 years.   If you are 50 years or older, your doctor might recommend a physical every year.  If you have an ongoing health condition, like diabetes or high blood pressure, your doctor will probably want to see you more often.  What happens during a physical? -- In general, each visit will include:   Physical exam - The doctor or nurse will check your height, weight, heart rate, and blood pressure. They will also look at your eyes and ears. They will ask about how you are feeling and whether you have any symptoms that bother you.   Medicines - It's a good idea to bring a list of all the medicines you take to each doctor visit. Your doctor will talk to you about your medicines and answer any questions. Tell them if you are having any side effects that bother you. You should also tell them if you are having trouble paying for any of your medicines.   Habits and behaviors " "- This includes:   Your diet   Your exercise habits   Whether you smoke, drink alcohol, or use drugs   Whether you are sexually active   Whether you feel safe at home  Your doctor will talk to you about things you can do to improve your health and lower your risk of health problems. They will also offer help and support. For example, if you want to quit smoking, they can give you advice and might prescribe medicines. If you want to improve your diet or get more physical activity, they can help you with this, too.   Lab tests, if needed - The tests you get will depend on your age and situation. For example, your doctor might want to check your:   Cholesterol   Blood sugar   Iron level   Vaccines - The recommended vaccines will depend on your age, health, and what vaccines you already had. Vaccines are very important because they can prevent certain serious or deadly infections.   Discussion of screening - \"Screening\" means checking for diseases or other health problems before they cause symptoms. Your doctor can recommend screening based on your age, risk, and preferences. This might include tests to check for:   Cancer, such as breast, prostate, cervical, ovarian, colorectal, prostate, lung, or skin cancer   Sexually transmitted infections, such as chlamydia and gonorrhea   Mental health conditions like depression and anxiety  Your doctor will talk to you about the different types of screening tests. They can help you decide which screenings to have. They can also explain what the results might mean.   Answering questions - The physical is a good time to ask the doctor or nurse questions about your health. If needed, they can refer you to other doctors or specialists, too.  Adults older than 65 years often need other care, too. As you get older, your doctor will talk to you about:   How to prevent falling at home   Hearing or vision tests   Memory testing   How to take your medicines safely   Making sure that you " have the help and support you need at home  All topics are updated as new evidence becomes available and our peer review process is complete.  This topic retrieved from [employer] on: May 02, 2024.  Topic 362250 Version 1.0  Release: 32.4.3 - C32.122  © 2024 UpToDate, Inc. and/or its affiliates. All rights reserved.  Consumer Information Use and Disclaimer   Disclaimer: This generalized information is a limited summary of diagnosis, treatment, and/or medication information. It is not meant to be comprehensive and should be used as a tool to help the user understand and/or assess potential diagnostic and treatment options. It does NOT include all information about conditions, treatments, medications, side effects, or risks that may apply to a specific patient. It is not intended to be medical advice or a substitute for the medical advice, diagnosis, or treatment of a health care provider based on the health care provider's examination and assessment of a patient's specific and unique circumstances. Patients must speak with a health care provider for complete information about their health, medical questions, and treatment options, including any risks or benefits regarding use of medications. This information does not endorse any treatments or medications as safe, effective, or approved for treating a specific patient. UpToDate, Inc. and its affiliates disclaim any warranty or liability relating to this information or the use thereof.The use of this information is governed by the Terms of Use, available at https://www.woltersShopSquad/Ownzauwer.com/en/know/clinical-effectiveness-terms. 2024© UpToDate, Inc. and its affiliates and/or licensors. All rights reserved.  Copyright   © 2024 UpToDate, Inc. and/or its affiliates. All rights reserved.

## 2025-05-23 NOTE — PROGRESS NOTES
Name: Nataly Shea      : 1965      MRN: [id number]  Encounter Provider: Helder Giordano MD  Encounter Date: 2025   Encounter department: [facility] PRIMARY CARE  :  Assessment & Plan           History of Present Illness {?Quick Links Encounters * My Last Note * Last Note in Specialty * Snapshot * Since Last Visit * History :30264}  HPI  Review of Systems    Objective {?Quick Links Trend Vitals * Enter New Vitals * Results Review * Timeline (Adult) * Labs * Imaging * Cardiology * Procedures * Lung Cancer Screening * Surgical eConsent :02307}  There were no vitals taken for this visit.     Physical Exam  {Administrative / Billing Section (Optional):40691}  Answers submitted by the patient for this visit:  Annual Physical (Submitted on 2025)  Diet/Nutrition choices: heart healthy (low sodium) diet, consuming 3-5 servings of fruits/vegetables daily, adequate fiber intake, adequate whole grain intake  Exercise choices: moderate cardiovascular exercise, 5-7 times a week on average, 30-60 minutes on average  Sleep choices: 7-8 hours of sleep on average, snores loudly  Hearing choices: normal hearing bilateral ears  Vision choices: most recent eye exam < 1 year ago, wears glasses and contacts  Dental choices: regular dental visits, brushes teeth three times daily  Do you currently have an OB/GYN provider that you routinely follow with?: Yes  Menopausal status: postmenopausal  Any history of sexual transmitted disease/infection?: No  Contraception: menopause  Do you have an advance directive/living will?: No  Do you have a durable power of  (POA)?: No

## 2025-05-28 PROBLEM — H16.003: Status: ACTIVE | Noted: 2023-04-10

## 2025-06-10 ENCOUNTER — RESULTS FOLLOW-UP (OUTPATIENT)
Dept: FAMILY MEDICINE CLINIC | Facility: CLINIC | Age: 60
End: 2025-06-10

## 2025-06-12 NOTE — TELEPHONE ENCOUNTER
Pt returned a call from the office.    Relayed results to patient as per provider message.     Helder Giordano MD  6/10/2025 11:11 PM    Tests were normal. Please follow up at next office visit as scheduled.  Please see the patient message for MyChart for discussion/instructions if patient has MyChart account.    Patient/Patient Representative expressed understanding and did not have any further questions.

## 2025-06-23 ENCOUNTER — TELEPHONE (OUTPATIENT)
Dept: GASTROENTEROLOGY | Facility: MEDICAL CENTER | Age: 60
End: 2025-06-23

## 2025-06-23 DIAGNOSIS — J30.2 SEASONAL ALLERGIC RHINITIS, UNSPECIFIED TRIGGER: ICD-10-CM

## 2025-06-23 RX ORDER — MONTELUKAST SODIUM 10 MG/1
10 TABLET ORAL
Qty: 30 TABLET | Refills: 5 | Status: SHIPPED | OUTPATIENT
Start: 2025-06-23

## 2025-06-23 NOTE — LETTER
Attached are your prep instructions for your upcoming procedure on 10/20/25. If you have any questions or concerns please contact us at [phone].    Thank you,     [facility]

## 2025-06-23 NOTE — TELEPHONE ENCOUNTER
Recall colonoscopy letter was mailed to pt for scheduling, last colonoscopy was 01/24/2025 6 months repeat colonoscopy

## 2025-06-30 ENCOUNTER — PREP FOR PROCEDURE (OUTPATIENT)
Age: 60
End: 2025-06-30

## 2025-06-30 DIAGNOSIS — Z86.0100 HISTORY OF COLON POLYPS: Primary | ICD-10-CM

## 2025-06-30 NOTE — TELEPHONE ENCOUNTER
Called and spoke to pt to schedule 6 month repeat recall colonoscopy, pt has scheduled. Will mail 2 day Golytely Prep to patients address on file.     Procedure: Colonoscopy   Date: 09/19/2025  Physician performing: Dr. Jaiyeola  Location of procedure:  [place]  Instructions given to patient: 2 Day Golytely  Diabetic: N/A  Clearances: N/A

## 2025-06-30 NOTE — TELEPHONE ENCOUNTER
Patients GI provider:  Dr. Jaiyeola    Number to return call: [phone]    Reason for call: Pt called in to schedule 6month repeat colonoscopy. Patient was hospitalized due to abdominal surgery back in January. Would patient need to be seen in the office first or can patient get scheduled for colonoscopy? Please reach out to patient, thank you.    Scheduled procedure/appointment date if applicable: Apt/procedure n/a

## 2025-07-03 NOTE — TELEPHONE ENCOUNTER
Rescheduled from 9/19/25      Scheduled date of colonoscopy (as of today):10/20/25    Physician performing colonoscopy:Dr Jiayeola    Location of colonoscopy:[place]    Bowel prep reviewed with patient:2 day golytely     Instructions reviewed with patient by:prep instructions sent via [email or web address]    Clearances: N/A

## 2025-07-22 DIAGNOSIS — H20.9 UVEITIS: ICD-10-CM

## 2025-07-25 ENCOUNTER — HOSPITAL ENCOUNTER (OUTPATIENT)
Dept: MAMMOGRAPHY | Facility: MEDICAL CENTER | Age: 60
Discharge: HOME | End: 2025-07-25
Payer: COMMERCIAL

## 2025-07-25 VITALS — WEIGHT: 174 LBS | HEIGHT: 67 IN | BODY MASS INDEX: 27.31 KG/M2

## 2025-07-25 DIAGNOSIS — Z12.31 ENCOUNTER FOR SCREENING MAMMOGRAM FOR BREAST CANCER: ICD-10-CM

## 2025-07-25 PROCEDURE — 77067 SCR MAMMO BI INCL CAD: CPT

## 2025-07-25 PROCEDURE — 77063 BREAST TOMOSYNTHESIS BI: CPT

## 2025-07-28 RX ORDER — KETOROLAC TROMETHAMINE 4 MG/ML
SOLUTION/ DROPS OPHTHALMIC
Qty: 5 ML | Refills: 0 | Status: SHIPPED | OUTPATIENT
Start: 2025-07-28

## (undated) DEVICE — TRAY FOLEY 16FR URIMETER SILICONE SURESTEP

## (undated) DEVICE — NEEDLE 25G X 1 1/2

## (undated) DEVICE — INSUFFLATION TUBING PRIMFLO

## (undated) DEVICE — TROCAR: Brand: KII SLEEVE

## (undated) DEVICE — SYRINGE 10ML LL

## (undated) DEVICE — MARYLAND JAW LAPAROSCOPIC SEALER/DIVIDER COATED: Brand: LIGASURE

## (undated) DEVICE — TISSUE RETRIEVAL SYSTEM: Brand: INZII RETRIEVAL SYSTEM

## (undated) DEVICE — CHLORAPREP HI-LITE 26ML ORANGE

## (undated) DEVICE — STRL COTTON TIP APPLCTR 6IN PK: Brand: CARDINAL HEALTH

## (undated) DEVICE — TRAP,MUCUS SPECIMEN, 80CC: Brand: MEDLINE

## (undated) DEVICE — TUBING SMOKE EVAC W/FILTRATION DEVICE PLUMEPORT ACTIV

## (undated) DEVICE — SURGICEL 4 X 8IN

## (undated) DEVICE — TROCAR: Brand: KII FIOS FIRST ENTRY

## (undated) DEVICE — GLOVE SRG LF STRL BGL SKNSNS 6.5 PF

## (undated) DEVICE — CHLORHEXIDINE 4PCT 4 OZ

## (undated) DEVICE — ADHESIVE SKIN HIGH VISCOSITY EXOFIN 1ML

## (undated) DEVICE — PACK PBDS STERILE LAP LITHOTOMY RF

## (undated) DEVICE — EXOFIN PRECISION PEN HIGH VISCOSITY TOPICAL SKIN ADHESIVE: Brand: EXOFIN PRECISION PEN, 1G

## (undated) DEVICE — 2, DISPOSABLE SUCTION/IRRIGATOR WITHOUT DISPOSABLE TIP: Brand: STRYKEFLOW

## (undated) DEVICE — ELECTRODE BLADE MOD  E-Z CLEAN 6.5IN -0014M

## (undated) DEVICE — SUT MONOCRYL 4-0 PS-2 18 IN Y496G

## (undated) DEVICE — GLOVE INDICATOR PI UNDERGLOVE SZ 7 BLUE

## (undated) DEVICE — PENCIL ELECTROSURG E-Z CLEAN -0035H

## (undated) DEVICE — ANTI-FOG SOLUTION WITH FOAM PAD: Brand: DEVON

## (undated) DEVICE — 40595 XL TRENDELENBURG POSITIONING KIT: Brand: 40595 XL TRENDELENBURG POSITIONING KIT

## (undated) DEVICE — INTENDED FOR TISSUE SEPARATION, AND OTHER PROCEDURES THAT REQUIRE A SHARP SURGICAL BLADE TO PUNCTURE OR CUT.: Brand: BARD-PARKER SAFETY BLADES SIZE 11, STERILE